# Patient Record
Sex: FEMALE | Race: WHITE | NOT HISPANIC OR LATINO | Employment: STUDENT | RURAL
[De-identification: names, ages, dates, MRNs, and addresses within clinical notes are randomized per-mention and may not be internally consistent; named-entity substitution may affect disease eponyms.]

---
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
## 2025-01-16 DIAGNOSIS — Z30.011 ENCOUNTER FOR INITIAL PRESCRIPTION OF CONTRACEPTIVE PILLS: ICD-10-CM

## 2025-01-16 RX ORDER — LEVONORGESTREL/ETHIN.ESTRADIOL 0.1-0.02MG
1 TABLET ORAL DAILY
Qty: 28 TABLET | Refills: 2 | Status: SHIPPED | OUTPATIENT
Start: 2025-01-16 | End: 2026-01-16

## 2025-01-16 RX ORDER — FLUCONAZOLE 100 MG/1
100 TABLET ORAL DAILY
Qty: 5 TABLET | Refills: 0 | Status: SHIPPED | OUTPATIENT
Start: 2025-01-16 | End: 2025-01-21

## 2025-03-18 ENCOUNTER — OFFICE VISIT (OUTPATIENT)
Dept: OBSTETRICS AND GYNECOLOGY | Facility: CLINIC | Age: 20
End: 2025-03-18
Payer: COMMERCIAL

## 2025-03-18 VITALS
HEIGHT: 66 IN | RESPIRATION RATE: 19 BRPM | BODY MASS INDEX: 25.55 KG/M2 | DIASTOLIC BLOOD PRESSURE: 72 MMHG | OXYGEN SATURATION: 100 % | WEIGHT: 159 LBS | HEART RATE: 89 BPM | SYSTOLIC BLOOD PRESSURE: 110 MMHG

## 2025-03-18 DIAGNOSIS — N92.1 MENORRHAGIA WITH IRREGULAR CYCLE: ICD-10-CM

## 2025-03-18 DIAGNOSIS — N94.6 DYSMENORRHEA: Primary | ICD-10-CM

## 2025-03-18 PROCEDURE — 3008F BODY MASS INDEX DOCD: CPT | Mod: CPTII,,, | Performed by: ADVANCED PRACTICE MIDWIFE

## 2025-03-18 PROCEDURE — 99999 PR PBB SHADOW E&M-EST. PATIENT-LVL IV: CPT | Mod: PBBFAC,,, | Performed by: ADVANCED PRACTICE MIDWIFE

## 2025-03-18 PROCEDURE — 1159F MED LIST DOCD IN RCRD: CPT | Mod: CPTII,,, | Performed by: ADVANCED PRACTICE MIDWIFE

## 2025-03-18 PROCEDURE — 3074F SYST BP LT 130 MM HG: CPT | Mod: CPTII,,, | Performed by: ADVANCED PRACTICE MIDWIFE

## 2025-03-18 PROCEDURE — 3078F DIAST BP <80 MM HG: CPT | Mod: CPTII,,, | Performed by: ADVANCED PRACTICE MIDWIFE

## 2025-03-18 PROCEDURE — 99214 OFFICE O/P EST MOD 30 MIN: CPT | Mod: PBBFAC | Performed by: ADVANCED PRACTICE MIDWIFE

## 2025-03-18 PROCEDURE — 99213 OFFICE O/P EST LOW 20 MIN: CPT | Mod: S$PBB,,, | Performed by: ADVANCED PRACTICE MIDWIFE

## 2025-03-18 RX ORDER — NORGESTIMATE AND ETHINYL ESTRADIOL 7DAYSX3 LO
1 KIT ORAL DAILY
Qty: 28 TABLET | Refills: 11 | Status: SHIPPED | OUTPATIENT
Start: 2025-03-18 | End: 2026-03-18

## 2025-03-18 RX ORDER — IBUPROFEN 800 MG/1
800 TABLET ORAL EVERY 8 HOURS PRN
Qty: 30 TABLET | Refills: 11 | Status: SHIPPED | OUTPATIENT
Start: 2025-03-18 | End: 2026-03-18

## 2025-03-18 NOTE — PROGRESS NOTES
"Subjective     Patient ID: Reginald Castro is a 19 y.o. female.    Chief Complaint: Menometrorrhagia (Pt stated that she has been having irregular bleeding and you up the mg of her OCP but she has since stopped taking it because she was having "bad mood swings and was really irritable." Pt did ask if you could seen in ibuprofen 800 mg for pain when she is on her cycle.)    Stopped taking OC's because of mood swings and irritability.   No cycle since stopping OC's  Cycles are painful, pain starts 1-2 days before cycle.         Review of Systems   Constitutional: Negative.    HENT: Negative.     Eyes: Negative.    Respiratory: Negative.     Cardiovascular: Negative.    Gastrointestinal: Negative.    Endocrine: Negative.    Genitourinary:  Positive for menstrual irregularity and menstrual problem.   Musculoskeletal: Negative.    Integumentary:  Negative.   Allergic/Immunologic: Negative.    Neurological: Negative.    Psychiatric/Behavioral: Negative.       Past Medical History:   Diagnosis Date    Anxiety disorder, unspecified     Depression      Past Surgical History:   Procedure Laterality Date    MYRINGOTOMY WITH INSERTION OF VENTILATION TUBE Bilateral 2021    Procedure: MYRINGOTOMY, WITH TYMPANOSTOMY TUBE INSERTION;  Surgeon: Ethan Willett MD;  Location: [facility] OR;  Service: ENT;  Laterality: Bilateral;    TONSILLECTOMY, ADENOIDECTOMY Bilateral 2021    Procedure: TONSILLECTOMY AND ADENOIDECTOMY;  Surgeon: Ethan Willett MD;  Location: [facility] OR;  Service: ENT;  Laterality: Bilateral;      OB History    Para Term  AB Living   0 0 0 0 0 0   SAB IAB Ectopic Multiple Live Births   0 0 0 0 0      Current Outpatient Medications   Medication Instructions    ibuprofen (ADVIL,MOTRIN) 800 mg, Oral, Every 8 hours PRN    levonorgestrel-ethinyl estradiol 0.1-20 mg-mcg per tablet 1 tablet, Oral, Daily    norgestimate-ethinyl estradioL (ORTHO TRI-CYCLEN LO) 0.18/0.215/0.25 mg-25 mcg tablet 1 " tablet, Oral, Daily    venlafaxine (EFFEXOR-XR) 37.5 mg, Daily         Objective   Vitals:    03/18/25 1511   BP: 110/72   Pulse: 89   Resp: 19     Wt Readings from Last 2 Encounters:   03/18/25 72.1 kg (159 lb)   11/12/24 76.2 kg (168 lb)      Physical Exam  Vitals reviewed.   Constitutional:       Appearance: Normal appearance.   HENT:      Head: Normocephalic.   Cardiovascular:      Rate and Rhythm: Normal rate and regular rhythm.   Pulmonary:      Effort: Pulmonary effort is normal.      Breath sounds: Normal breath sounds.   Abdominal:      General: Abdomen is flat.      Palpations: Abdomen is soft.   Musculoskeletal:         General: Normal range of motion.      Cervical back: Normal range of motion.   Skin:     General: Skin is warm and dry.   Neurological:      Mental Status: She is alert and oriented to person, place, and time.   Psychiatric:         Mood and Affect: Mood normal.         Behavior: Behavior normal.        Assessment and Plan   1. Dysmenorrhea  -     ibuprofen (ADVIL,MOTRIN) 800 MG tablet; Take 1 tablet (800 mg total) by mouth every 8 (eight) hours as needed for Pain.  Dispense: 30 tablet; Refill: 11  -     norgestimate-ethinyl estradioL (ORTHO TRI-CYCLEN LO) 0.18/0.215/0.25 mg-25 mcg tablet; Take 1 tablet by mouth once daily.  Dispense: 28 tablet; Refill: 11    2. Menorrhagia with irregular cycle  -     ibuprofen (ADVIL,MOTRIN) 800 MG tablet; Take 1 tablet (800 mg total) by mouth every 8 (eight) hours as needed for Pain.  Dispense: 30 tablet; Refill: 11  -     norgestimate-ethinyl estradioL (ORTHO TRI-CYCLEN LO) 0.18/0.215/0.25 mg-25 mcg tablet; Take 1 tablet by mouth once daily.  Dispense: 28 tablet; Refill: 11    RX Ibuprofen 800mg and Ortho Tri-cyclen Lo sent to pharmacy  Start new ELLIOTT today, take every day, do not skip pills.  Expect irregular bleeding for 3-4 months.  If problems, call for follow up.      Follow up in 1 year (on 3/18/2026), or if symptoms worsen or fail to improve, for  Birth Control Follow Up.

## 2025-05-19 ENCOUNTER — HOSPITAL ENCOUNTER (EMERGENCY)
Facility: HOSPITAL | Age: 20
Discharge: HOME | End: 2025-05-19
Payer: COMMERCIAL

## 2025-05-19 VITALS
WEIGHT: 151 LBS | BODY MASS INDEX: 24.27 KG/M2 | SYSTOLIC BLOOD PRESSURE: 100 MMHG | DIASTOLIC BLOOD PRESSURE: 67 MMHG | TEMPERATURE: 98 F | HEIGHT: 66 IN | OXYGEN SATURATION: 98 % | HEART RATE: 64 BPM | RESPIRATION RATE: 18 BRPM

## 2025-05-19 DIAGNOSIS — N39.0 URINARY TRACT INFECTION WITH HEMATURIA, SITE UNSPECIFIED: ICD-10-CM

## 2025-05-19 DIAGNOSIS — R11.2 NAUSEA, VOMITING, AND DIARRHEA: ICD-10-CM

## 2025-05-19 DIAGNOSIS — R10.9 ABDOMINAL CRAMPING: ICD-10-CM

## 2025-05-19 DIAGNOSIS — R31.9 URINARY TRACT INFECTION WITH HEMATURIA, SITE UNSPECIFIED: ICD-10-CM

## 2025-05-19 DIAGNOSIS — K52.9 AGE (ACUTE GASTROENTERITIS): Primary | ICD-10-CM

## 2025-05-19 DIAGNOSIS — R19.7 NAUSEA, VOMITING, AND DIARRHEA: ICD-10-CM

## 2025-05-19 LAB
ALBUMIN SERPL BCP-MCNC: 4.5 G/DL (ref 3.5–5)
ALBUMIN/GLOB SERPL: 1.3 {RATIO}
ALP SERPL-CCNC: 36 U/L (ref 40–150)
ALT SERPL W P-5'-P-CCNC: 12 U/L
ANION GAP SERPL CALCULATED.3IONS-SCNC: 18 MMOL/L (ref 7–16)
AST SERPL W P-5'-P-CCNC: 21 U/L (ref 11–45)
BACTERIA #/AREA URNS HPF: ABNORMAL /HPF
BASOPHILS # BLD AUTO: 0.05 K/UL (ref 0–0.2)
BASOPHILS NFR BLD AUTO: 0.3 % (ref 0–1)
BILIRUB SERPL-MCNC: 0.4 MG/DL
BILIRUB UR QL STRIP: ABNORMAL
BUN SERPL-MCNC: 13 MG/DL (ref 7–19)
BUN/CREAT SERPL: 15 (ref 6–20)
CALCIUM SERPL-MCNC: 9.5 MG/DL (ref 8.4–10.2)
CHLORIDE SERPL-SCNC: 105 MMOL/L (ref 98–107)
CLARITY UR: ABNORMAL
CO2 SERPL-SCNC: 20 MMOL/L (ref 22–29)
COLOR UR: YELLOW
CREAT SERPL-MCNC: 0.87 MG/DL (ref 0.55–1.02)
DIFFERENTIAL METHOD BLD: ABNORMAL
EGFR (NO RACE VARIABLE) (RUSH/TITUS): 99 ML/MIN/1.73M2
EOSINOPHIL # BLD AUTO: 0.28 K/UL (ref 0–0.5)
EOSINOPHIL NFR BLD AUTO: 1.8 % (ref 1–4)
ERYTHROCYTE [DISTWIDTH] IN BLOOD BY AUTOMATED COUNT: 12.2 % (ref 11.5–14.5)
GLOBULIN SER-MCNC: 3.6 G/DL (ref 2–4)
GLUCOSE SERPL-MCNC: 121 MG/DL (ref 74–100)
GLUCOSE UR STRIP-MCNC: NEGATIVE MG/DL
HCG UR QL IA.RAPID: NEGATIVE
HCT VFR BLD AUTO: 38.6 % (ref 38–47)
HGB BLD-MCNC: 13.6 G/DL (ref 12–16)
IMM GRANULOCYTES # BLD AUTO: 0.05 K/UL (ref 0–0.04)
IMM GRANULOCYTES NFR BLD: 0.3 % (ref 0–0.4)
KETONES UR STRIP-SCNC: ABNORMAL MG/DL
LEUKOCYTE ESTERASE UR QL STRIP: NEGATIVE
LIPASE SERPL-CCNC: 26 U/L
LYMPHOCYTES # BLD AUTO: 2.78 K/UL (ref 1–4.8)
LYMPHOCYTES NFR BLD AUTO: 18.3 % (ref 27–41)
MAGNESIUM SERPL-MCNC: 1.7 MG/DL (ref 1.7–2.2)
MCH RBC QN AUTO: 30.4 PG (ref 27–31)
MCHC RBC AUTO-ENTMCNC: 35.2 G/DL (ref 32–36)
MCV RBC AUTO: 86.2 FL (ref 80–96)
MONOCYTES # BLD AUTO: 0.95 K/UL (ref 0–0.8)
MONOCYTES NFR BLD AUTO: 6.3 % (ref 2–6)
MPC BLD CALC-MCNC: 12.2 FL (ref 9.4–12.4)
MUCOUS THREADS #/AREA URNS HPF: ABNORMAL /HPF
NEUTROPHILS # BLD AUTO: 11.09 K/UL (ref 1.8–7.7)
NEUTROPHILS NFR BLD AUTO: 73 % (ref 53–65)
NITRITE UR QL STRIP: NEGATIVE
NRBC # BLD AUTO: 0 X10E3/UL
NRBC, AUTO (.00): 0 %
PH UR STRIP: 6.5 PH UNITS
PLATELET # BLD AUTO: 249 K/UL (ref 150–400)
POTASSIUM SERPL-SCNC: 3.5 MMOL/L (ref 3.5–5.1)
PROT SERPL-MCNC: 8.1 G/DL (ref 6.4–8.3)
PROT UR QL STRIP: 30
RBC # BLD AUTO: 4.48 M/UL (ref 4.2–5.4)
RBC # UR STRIP: ABNORMAL /UL
RBC #/AREA URNS HPF: ABNORMAL /HPF
SODIUM SERPL-SCNC: 139 MMOL/L (ref 136–145)
SP GR UR STRIP: 1.02
SQUAMOUS #/AREA URNS LPF: ABNORMAL /LPF
UROBILINOGEN UR STRIP-ACNC: 0.2 MG/DL
WBC # BLD AUTO: 15.2 K/UL (ref 4.5–11)
WBC #/AREA URNS HPF: ABNORMAL /HPF

## 2025-05-19 PROCEDURE — 25500020 PHARM REV CODE 255

## 2025-05-19 PROCEDURE — 85025 COMPLETE CBC W/AUTO DIFF WBC: CPT

## 2025-05-19 PROCEDURE — 81003 URINALYSIS AUTO W/O SCOPE: CPT

## 2025-05-19 PROCEDURE — 25000003 PHARM REV CODE 250

## 2025-05-19 PROCEDURE — 80053 COMPREHEN METABOLIC PANEL: CPT

## 2025-05-19 PROCEDURE — 96365 THER/PROPH/DIAG IV INF INIT: CPT

## 2025-05-19 PROCEDURE — 63600175 PHARM REV CODE 636 W HCPCS

## 2025-05-19 PROCEDURE — 83735 ASSAY OF MAGNESIUM: CPT

## 2025-05-19 PROCEDURE — 87077 CULTURE AEROBIC IDENTIFY: CPT

## 2025-05-19 PROCEDURE — 83690 ASSAY OF LIPASE: CPT

## 2025-05-19 PROCEDURE — 96361 HYDRATE IV INFUSION ADD-ON: CPT

## 2025-05-19 PROCEDURE — 99284 EMERGENCY DEPT VISIT MOD MDM: CPT | Mod: ,,,

## 2025-05-19 PROCEDURE — 96372 THER/PROPH/DIAG INJ SC/IM: CPT

## 2025-05-19 PROCEDURE — 81025 URINE PREGNANCY TEST: CPT

## 2025-05-19 PROCEDURE — 99285 EMERGENCY DEPT VISIT HI MDM: CPT | Mod: 25

## 2025-05-19 PROCEDURE — 96375 TX/PRO/DX INJ NEW DRUG ADDON: CPT

## 2025-05-19 RX ORDER — DICYCLOMINE HYDROCHLORIDE 10 MG/ML
20 INJECTION INTRAMUSCULAR
Status: COMPLETED | OUTPATIENT
Start: 2025-05-19 | End: 2025-05-19

## 2025-05-19 RX ORDER — CEFTRIAXONE 1 G/1
1 INJECTION, POWDER, FOR SOLUTION INTRAMUSCULAR; INTRAVENOUS
Status: COMPLETED | OUTPATIENT
Start: 2025-05-19 | End: 2025-05-19

## 2025-05-19 RX ORDER — FLUOXETINE 10 MG/1
10 CAPSULE ORAL DAILY
COMMUNITY

## 2025-05-19 RX ORDER — ONDANSETRON 4 MG/1
4 TABLET, ORALLY DISINTEGRATING ORAL EVERY 8 HOURS PRN
Qty: 10 TABLET | Refills: 0 | Status: SHIPPED | OUTPATIENT
Start: 2025-05-19

## 2025-05-19 RX ORDER — ACETAMINOPHEN 325 MG/1
650 TABLET ORAL
Status: COMPLETED | OUTPATIENT
Start: 2025-05-19 | End: 2025-05-19

## 2025-05-19 RX ORDER — IOPAMIDOL 755 MG/ML
100 INJECTION, SOLUTION INTRAVASCULAR
Status: COMPLETED | OUTPATIENT
Start: 2025-05-19 | End: 2025-05-19

## 2025-05-19 RX ORDER — NITROFURANTOIN 25; 75 MG/1; MG/1
100 CAPSULE ORAL 2 TIMES DAILY
Qty: 10 CAPSULE | Refills: 0 | Status: SHIPPED | OUTPATIENT
Start: 2025-05-19 | End: 2025-05-24

## 2025-05-19 RX ORDER — ONDANSETRON HYDROCHLORIDE 2 MG/ML
4 INJECTION, SOLUTION INTRAVENOUS
Status: COMPLETED | OUTPATIENT
Start: 2025-05-19 | End: 2025-05-19

## 2025-05-19 RX ADMIN — DICYCLOMINE HYDROCHLORIDE 20 MG: 10 INJECTION, SOLUTION INTRAMUSCULAR at 04:05

## 2025-05-19 RX ADMIN — PROMETHAZINE HYDROCHLORIDE 12.5 MG: 25 INJECTION INTRAMUSCULAR; INTRAVENOUS at 05:05

## 2025-05-19 RX ADMIN — CEFTRIAXONE SODIUM 1 G: 1 INJECTION, POWDER, FOR SOLUTION INTRAMUSCULAR; INTRAVENOUS at 04:05

## 2025-05-19 RX ADMIN — IOPAMIDOL 100 ML: 755 INJECTION, SOLUTION INTRAVENOUS at 05:05

## 2025-05-19 RX ADMIN — ONDANSETRON 4 MG: 2 INJECTION INTRAMUSCULAR; INTRAVENOUS at 03:05

## 2025-05-19 RX ADMIN — SODIUM CHLORIDE 1000 ML: 0.9 INJECTION, SOLUTION INTRAVENOUS at 03:05

## 2025-05-19 RX ADMIN — ACETAMINOPHEN 650 MG: 325 TABLET ORAL at 04:05

## 2025-05-19 NOTE — Clinical Note
"                        Reginald SMITH"Gloria was seen and treated in our emergency department on 5/19/2025.  She may return to work on 05/21/2025.       If you have any questions or concerns, please don't hesitate to call.      Saeed Allison, FNP"

## 2025-05-19 NOTE — DISCHARGE INSTRUCTIONS
Suspect viral gastroenteritis.  Take Zofran as prescribed for nausea/vomiting.  Tylenol over-the-counter for pain and fever control.  Increase fluids especially electrolyte balanced fluids such as Gatorade, Powerade, or Pedialyte to maintain proper hydration.  Antibiotics as directed.  Urine culture is pending and we will call you if there are any changes that need to be made to the treatment plan once these results.  Follow up with the primary care provider of your choice in 1-2 days for a recheck.  Return to the emergency department for uncontrolled pain, uncontrolled vomiting, or any other new or worrisome symptoms.

## 2025-05-19 NOTE — ED PROVIDER NOTES
Encounter Date: 5/19/2025       History     Chief Complaint   Patient presents with    Vomiting     V/D x 2 hrs     [age]-year-old female presents to the ED for N/V/D x2 hours.  Symptoms began suddenly and awoke her from her sleep.  Does admit to abdominal cramping.  Denies hematochezia, hematemesis, fever, or any other complaints at this time.  No treatment prior to arrival to the ED. Vital signs stable.  She appears in no immediate distress.    The history is provided by the patient.     Review of patient's allergies indicates:  No Known Allergies  Past Medical History:   Diagnosis Date    Anxiety disorder, unspecified     Depression      Past Surgical History:   Procedure Laterality Date    MYRINGOTOMY WITH INSERTION OF VENTILATION TUBE Bilateral 12/28/2021    Procedure: MYRINGOTOMY, WITH TYMPANOSTOMY TUBE INSERTION;  Surgeon: Ethan Willett MD;  Location: [facility];  Service: ENT;  Laterality: Bilateral;    TONSILLECTOMY, ADENOIDECTOMY Bilateral 12/28/2021    Procedure: TONSILLECTOMY AND ADENOIDECTOMY;  Surgeon: Ethan Willett MD;  Location: [facility];  Service: ENT;  Laterality: Bilateral;     Family History   Problem Relation Name Age of Onset    Ovarian cancer Maternal Grandmother      Cancer Maternal Grandmother          ovarian    Hypertension Father      Diabetes Father      Hypertension Mother      Diabetes Mother       Social History[1]  Review of Systems   Constitutional:  Negative for activity change, chills and fever.   HENT:  Negative for congestion and sore throat.    Eyes: Negative.    Respiratory:  Negative for cough and shortness of breath.    Cardiovascular:  Negative for chest pain and palpitations.   Gastrointestinal:  Positive for abdominal pain (Generalized cramping), diarrhea, nausea and vomiting.   Endocrine: Negative.    Genitourinary:  Negative for dysuria and frequency.   Musculoskeletal:  Negative for back pain, neck pain and neck stiffness.   Skin:  Negative for color  change, pallor and rash.   Allergic/Immunologic: Negative.    Neurological:  Negative for dizziness, syncope, speech difficulty, weakness and headaches.   Hematological:  Does not bruise/bleed easily.   Psychiatric/Behavioral:  Negative for confusion. The patient is not nervous/anxious.    All other systems reviewed and are negative.      Physical Exam     Initial Vitals [05/19/25 0300]   BP Pulse Resp Temp SpO2   126/86 98 18 97.5 °F (36.4 °C) 99 %      MAP       --         Physical Exam    Nursing note and vitals reviewed.  Constitutional: Vital signs are normal. She appears well-developed and well-nourished. She is not diaphoretic. She is cooperative. She appears ill. No distress.   HENT:   Head: Normocephalic and atraumatic. Mouth/Throat: Oropharynx is clear and moist.   Eyes: Conjunctivae and EOM are normal. Pupils are equal, round, and reactive to light.   Neck: Neck supple.   Normal range of motion.  Cardiovascular:  Normal rate, regular rhythm and normal heart sounds.           Pulmonary/Chest: Breath sounds normal. No respiratory distress. She has no wheezes. She has no rhonchi. She has no rales. She exhibits no tenderness.   Abdominal: Abdomen is soft. She exhibits no distension. Bowel sounds are increased. There is generalized abdominal tenderness.   No right CVA tenderness.  No left CVA tenderness. There is no rebound and no guarding.   Musculoskeletal:         General: Normal range of motion.      Cervical back: Normal range of motion and neck supple.     Neurological: She is alert and oriented to person, place, and time. She has normal strength. GCS score is 15. GCS eye subscore is 4. GCS verbal subscore is 5. GCS motor subscore is 6.   Skin: Skin is warm and dry. Capillary refill takes less than 2 seconds.   Psychiatric: She has a normal mood and affect. Her behavior is normal. Judgment and thought content normal.         Medical Screening Exam   See Full Note    ED Course   Procedures  Labs Reviewed    URINALYSIS, REFLEX TO URINE CULTURE - Abnormal       Result Value    Color, UA Yellow      Clarity, UA Cloudy      pH, UA 6.5      Leukocytes, UA Negative      Nitrites, UA Negative      Protein, UA 30 (*)     Glucose, UA Negative      Ketones, UA Trace      Urobilinogen, UA 0.2      Bilirubin, UA Small (*)     Blood, UA Trace-Intact (*)     Specific Gravity, UA 1.025     COMPREHENSIVE METABOLIC PANEL - Abnormal    Sodium 139      Potassium 3.5      Chloride 105      CO2 20 (*)     Anion Gap 18 (*)     Glucose 121 (*)     BUN 13      Creatinine 0.87      BUN/Creatinine Ratio 15      Calcium 9.5      Total Protein 8.1      Albumin 4.5      Globulin 3.6      A/G Ratio 1.3      Bilirubin, Total 0.4      Alk Phos 36 (*)     ALT 12      AST 21      eGFR 99     CBC WITH DIFFERENTIAL - Abnormal    WBC 15.20 (*)     RBC 4.48      Hemoglobin 13.6      Hematocrit 38.6      MCV 86.2      MCH 30.4      MCHC 35.2      RDW 12.2      Platelet Count 249      MPV 12.2      Neutrophils % 73.0 (*)     Lymphocytes % 18.3 (*)     Monocytes % 6.3 (*)     Eosinophils % 1.8      Basophils % 0.3      Immature Granulocytes % 0.3      nRBC, Auto 0.0      Neutrophils, Abs 11.09 (*)     Lymphocytes, Absolute 2.78      Monocytes, Absolute 0.95 (*)     Eosinophils, Absolute 0.28      Basophils, Absolute 0.05      Immature Granulocytes, Absolute 0.05 (*)     nRBC, Absolute 0.00      Diff Type Auto     URINALYSIS, MICROSCOPIC - Abnormal    WBC, UA 5-10 (*)     RBC, UA 0-3      Bacteria, UA Moderate (*)     Squamous Epithelial Cells, UA Moderate (*)     Mucus, UA Many (*)    HCG QUALITATIVE URINE - Normal    HCG Qualitative, Urine Negative     LIPASE - Normal    Lipase 26     MAGNESIUM - Normal    Magnesium 1.7     CULTURE, URINE   CBC W/ AUTO DIFFERENTIAL    Narrative:     The following orders were created for panel order CBC auto differential.  Procedure                               Abnormality         Status                     ---------                                -----------         ------                     CBC with Differential[[id number]]       Abnormal            Final result                 Please view results for these tests on the individual orders.          Imaging Results              CT Abdomen Pelvis With IV Contrast NO Oral Contrast (In process)                      Medications   sodium chloride 0.9% bolus 1,000 mL 1,000 mL (0 mLs Intravenous Stopped 5/19/25 0421)   ondansetron injection 4 mg (4 mg Intravenous Given 5/19/25 6747)   acetaminophen tablet 650 mg (650 mg Oral Given 5/19/25 0418)   dicyclomine injection 20 mg (20 mg Intramuscular Given 5/19/25 0420)   cefTRIAXone injection 1 g (1 g Intravenous Given 5/19/25 0426)   promethazine (PHENERGAN) 12.5 mg in 0.9% NaCl 50 mL IVPB (0 mg Intravenous Stopped 5/19/25 0532)   iopamidoL (ISOVUE-370) injection 100 mL (100 mLs Intravenous Given 5/19/25 3669)     Medical Decision Making  Presents for sudden onset of N/V/D.  A&O x4.  GCS 15.  Vital signs stable.  Currently afebrile.  Generalized abdominal cramping.  No focal tenderness on exam.  Actively vomiting at the time of the exam.  No hematemesis witnessed.  Suspect gastroenteritis.  We will initiate IV access, obtain baseline labs, send urine sample, provide Zofran 4 mg IV for control of nausea/vomiting, and normal saline 1 L IV bolus for rehydration.    04:35 re-evaluated once again.  Urine preg negative.  Providing p.o. Tylenol and IM Bentyl for control of abdominal cramping.  P.o. challenge initially tolerated well but had return of nausea during observation period.  No further vomiting thus far.  Results discussed in detail with the patient and her family.  Mild elevated WBC.  They also admit to multiple recent exposures to family and friends with the stomach virus.  She did have 5-10 WBC, moderate bacteria, many mucus in her urine sample and we will cover with Rocephin here in the ED and plan for a short course of Macrobid until the  urine culture can be resulted.  Still suspect gastroenteritis at current but given her elevated wbc, continued abdominal discomfort, and return of nausea we did recommend CT of the abdomen and pelvis to rule out appendicitis or other emergent intra-abdominal etiology.  Patient and family are in agreement.    07:05 CT shows mild enteritis.  Normal appendix.  Results discussed in detail with the patient and her family.  Discharge instructions and follow up discussed in detail.  Strict ED return precautions explained in detail.  We will provide written instructions as well.  All questions answered.  They verbalized understanding and agreement with this plan.    Amount and/or Complexity of Data Reviewed  Independent Historian:      Details: [age]-year-old female presents to the ED for N/V/D x2 hours.  Symptoms began suddenly and awoke her from her sleep.  Does admit to abdominal cramping.  Denies hematochezia, hematemesis, fever, or any other complaints at this time.  No treatment prior to arrival to the ED. Vital signs stable.  She appears in no immediate distress.  Labs: ordered.     Details: CBC shows a WBC of 15.2, hemoglobin of 13.6, hematocrit 38.6, and a platelet count 249.  Lipase normal at 26.  CMP shows CO2 of 20, anion gap of 18, glucose of 121, alk-phos of 36, and otherwise unremarkable.  Magnesium 1.7.  Urine pregnancy test negative.  Urinalysis shows 30 of protein, small bilirubin, and trace blood but otherwise unremarkable.  Microscopic urinalysis shows 5-10 WBC, moderate bacteria, many mucus.  Urine culture sent and pending.  Radiology: ordered.     Details: CT abdomen and pelvis with contrast shows possible mild enteritis.  Some mild segmental wall thickening in the small bowel.  No bowel obstruction.  Normal appendix.    Risk  OTC drugs.  Prescription drug management.  Risk Details: Reginald Castro likely has viral acute gastro-enteritis. Able to take down POs.  UTI so we will provide Macrobid  until urine culture results.    Given the large differential diagnosis for Reginald Castro, the decision making in this case is of high complexity.    After evaluating all of the data points in this case, the presentation of Reginald Castro is NOT consistent with AAA; Mesenteric Ischemia; Bowel Perforation; Bowel Obstruction; Sigmoid Volvulus; Diverticulitis; Appendicitis; Peritonitis; Cholecystitis, ascending cholangitis or other gallbladder disease; perforated ulcer; significant GI bleeding, splenic rupture/infarction; Hepatic abscess; or other surgical/acute abdomen.    Similarly, this presentation is NOT consistent with ACS or Myocardial Ischemia or cardiac etiology; Pulmonary Embolism; fistula; incarcerated hernia; Pancreatitis, Aortic Dissection; Diabetic Ketoacidosis; Kidney Stone; Ischemic colitis; Psoas or other abscess; Methanol poisoning; Heavy metal toxicity; or porphyria.    Similarly, this case is NOT consistent with Tkmb-Yyio-Gjnocv Syndrome, Ectopic Pregnancy, Placental Abruption, PID, Tubo-ovarian abscess, Ovarian Torsion, or STI.    Similarly, this presentation is NOT consistent with acute coronary syndrome, pulmonary embolism, dissection, borhaave's, arrythmia, pneumothorax, cardiac tamponade, or other emergent cardiopulmonary condition.    Similarly, this presentation is NOT consistent with sepsis, pyelonephritis, urinary infection, pneumonia, or other focal bacterial infection.    Strict return and follow-up precautions have been given by me personally to the patient/family/caregiver(s).    Data Reviewed/Counseling: I have reviewed the patient's vital signs, nursing notes, and other relevant tests/information. I had a detailed discussion regarding the historical points, exam findings, and any diagnostic results supporting the discharge diagnosis. I also discussed the need for outpatient follow-up and the need to return to the ED if symptoms worsen or if there are any questions or concerns that  arise at home.   Final diagnoses:  [R11.2, R19.7] Nausea, vomiting, and diarrhea  [R10.9] Abdominal cramping  [K52.9] AGE (acute gastroenteritis) (Primary)  [N39.0, R31.9] Urinary tract infection with hematuria, site unspecified               ED Course as of 05/19/25 0708   Mon May 19, 2025   0411 No further vomiting after Zofran and she reports improvement in nausea. Requesting something to drink so we will PO challenge with Pedialyte given her borderline electrolytes. Continues to have diffuse abd cramping with no focal tenderness on repeat palpation. Urine still pending. []   0542 Patient reports that she is feeling better after beginning the phenergan and family is requesting to go home. CT still pending and recommended obtaining results before disposition. They are agreeable at current. []      ED Course User Index  [] Saeed Allison FNP                           Clinical Impression:   Final diagnoses:  [R11.2, R19.7] Nausea, vomiting, and diarrhea  [R10.9] Abdominal cramping  [K52.9] AGE (acute gastroenteritis) (Primary)  [N39.0, R31.9] Urinary tract infection with hematuria, site unspecified        ED Disposition Condition    Discharge Stable          ED Prescriptions       Medication Sig Dispense Start Date End Date Auth. Provider    ondansetron (ZOFRAN-ODT) 4 MG TbDL Take 1 tablet (4 mg total) by mouth every 8 (eight) hours as needed (Nausea/vomiting). 10 tablet 5/19/2025 -- Saeed Allison FNP    nitrofurantoin, macrocrystal-monohydrate, (MACROBID) 100 MG capsule Take 1 capsule (100 mg total) by mouth 2 (two) times daily. for 5 days 10 capsule 5/19/2025 5/24/2025 Saeed Allison FNP          Follow-up Information       Follow up With Specialties Details Why Contact Info    Anderson Guo FNP-C Family Medicine Schedule an appointment as soon as possible for a visit in 2 days  [address] [place] [address] [place] [address] [phone]                   [1]   Social History  Tobacco Use    Smoking  status: Some Days     Types: Vaping with nicotine     Start date: 3/6/2024    Smokeless tobacco: Never   Substance Use Topics    Alcohol use: Never    Drug use: Never        Saeed Allison, [facility]  05/19/25 0708

## 2025-05-20 ENCOUNTER — RESULTS FOLLOW-UP (OUTPATIENT)
Dept: EMERGENCY MEDICINE | Facility: HOSPITAL | Age: 20
End: 2025-05-20

## 2025-05-21 LAB
UA COMPLETE W REFLEX CULTURE PNL UR: ABNORMAL
UA COMPLETE W REFLEX CULTURE PNL UR: ABNORMAL

## 2025-05-22 NOTE — TELEPHONE ENCOUNTER
Called and left a voicemail for her to call back. Carl wants to see if she wants Diflucan for yeast in urine culture.

## 2025-05-26 ENCOUNTER — HOSPITAL ENCOUNTER (EMERGENCY)
Facility: HOSPITAL | Age: 20
Discharge: HOME | End: 2025-05-27
Payer: COMMERCIAL

## 2025-05-26 DIAGNOSIS — R19.7 DIARRHEA, UNSPECIFIED TYPE: ICD-10-CM

## 2025-05-26 DIAGNOSIS — R11.0 NAUSEA: ICD-10-CM

## 2025-05-26 DIAGNOSIS — R10.84 GENERALIZED ABDOMINAL PAIN: Primary | ICD-10-CM

## 2025-05-26 PROCEDURE — 99284 EMERGENCY DEPT VISIT MOD MDM: CPT | Mod: ,,, | Performed by: NURSE PRACTITIONER

## 2025-05-26 PROCEDURE — 99285 EMERGENCY DEPT VISIT HI MDM: CPT | Mod: 25

## 2025-05-26 RX ORDER — ONDANSETRON HYDROCHLORIDE 2 MG/ML
4 INJECTION, SOLUTION INTRAVENOUS
Status: COMPLETED | OUTPATIENT
Start: 2025-05-27 | End: 2025-05-27

## 2025-05-26 RX ORDER — MORPHINE SULFATE 4 MG/ML
2 INJECTION, SOLUTION INTRAMUSCULAR; INTRAVENOUS
Refills: 0 | Status: COMPLETED | OUTPATIENT
Start: 2025-05-27 | End: 2025-05-27

## 2025-05-26 NOTE — Clinical Note
"Reginald SMITH" Gloria was seen and treated in our emergency department on 5/26/2025.  She may return to school on 05/28/2025.      If you have any questions or concerns, please don't hesitate to call.      Nikkie Flynn, SKYLER"

## 2025-05-27 VITALS
HEIGHT: 66 IN | TEMPERATURE: 98 F | BODY MASS INDEX: 24.56 KG/M2 | OXYGEN SATURATION: 100 % | RESPIRATION RATE: 18 BRPM | HEART RATE: 78 BPM | SYSTOLIC BLOOD PRESSURE: 102 MMHG | DIASTOLIC BLOOD PRESSURE: 70 MMHG | WEIGHT: 152.81 LBS

## 2025-05-27 LAB
ALBUMIN SERPL BCP-MCNC: 4.8 G/DL (ref 3.5–5)
ALBUMIN/GLOB SERPL: 1.3 {RATIO}
ALP SERPL-CCNC: 39 U/L (ref 40–150)
ALT SERPL W P-5'-P-CCNC: 15 U/L
ANION GAP SERPL CALCULATED.3IONS-SCNC: 17 MMOL/L (ref 7–16)
AST SERPL W P-5'-P-CCNC: 20 U/L (ref 11–45)
BASOPHILS # BLD AUTO: 0.05 K/UL (ref 0–0.2)
BASOPHILS NFR BLD AUTO: 0.4 % (ref 0–1)
BILIRUB SERPL-MCNC: 0.4 MG/DL
BILIRUB UR QL STRIP: NEGATIVE
BUN SERPL-MCNC: 12 MG/DL (ref 7–19)
BUN/CREAT SERPL: 12 (ref 6–20)
CALCIUM SERPL-MCNC: 9.7 MG/DL (ref 8.4–10.2)
CHLORIDE SERPL-SCNC: 103 MMOL/L (ref 98–107)
CLARITY UR: CLEAR
CO2 SERPL-SCNC: 22 MMOL/L (ref 22–29)
COLOR UR: YELLOW
CREAT SERPL-MCNC: 1.01 MG/DL (ref 0.55–1.02)
DIFFERENTIAL METHOD BLD: ABNORMAL
EGFR (NO RACE VARIABLE) (RUSH/TITUS): 82 ML/MIN/1.73M2
EOSINOPHIL # BLD AUTO: 0.11 K/UL (ref 0–0.5)
EOSINOPHIL NFR BLD AUTO: 0.9 % (ref 1–4)
ERYTHROCYTE [DISTWIDTH] IN BLOOD BY AUTOMATED COUNT: 12.2 % (ref 11.5–14.5)
GLOBULIN SER-MCNC: 3.8 G/DL (ref 2–4)
GLUCOSE SERPL-MCNC: 110 MG/DL (ref 74–100)
GLUCOSE UR STRIP-MCNC: NEGATIVE MG/DL
HCG UR QL IA.RAPID: NEGATIVE
HCT VFR BLD AUTO: 41.2 % (ref 38–47)
HGB BLD-MCNC: 14.1 G/DL (ref 12–16)
IMM GRANULOCYTES # BLD AUTO: 0.03 K/UL (ref 0–0.04)
IMM GRANULOCYTES NFR BLD: 0.2 % (ref 0–0.4)
KETONES UR STRIP-SCNC: NEGATIVE MG/DL
LACTATE SERPL-SCNC: 2.1 MMOL/L (ref 0.5–2.2)
LEUKOCYTE ESTERASE UR QL STRIP: NEGATIVE
LIPASE SERPL-CCNC: 23 U/L
LYMPHOCYTES # BLD AUTO: 2.82 K/UL (ref 1–4.8)
LYMPHOCYTES NFR BLD AUTO: 22.8 % (ref 27–41)
MAGNESIUM SERPL-MCNC: 1.7 MG/DL (ref 1.7–2.2)
MCH RBC QN AUTO: 30.3 PG (ref 27–31)
MCHC RBC AUTO-ENTMCNC: 34.2 G/DL (ref 32–36)
MCV RBC AUTO: 88.6 FL (ref 80–96)
MONOCYTES # BLD AUTO: 0.48 K/UL (ref 0–0.8)
MONOCYTES NFR BLD AUTO: 3.9 % (ref 2–6)
MPC BLD CALC-MCNC: 12.1 FL (ref 9.4–12.4)
NEUTROPHILS # BLD AUTO: 8.88 K/UL (ref 1.8–7.7)
NEUTROPHILS NFR BLD AUTO: 71.8 % (ref 53–65)
NITRITE UR QL STRIP: NEGATIVE
NRBC # BLD AUTO: 0 X10E3/UL
NRBC, AUTO (.00): 0 %
PH UR STRIP: 6.5 PH UNITS
PLATELET # BLD AUTO: 241 K/UL (ref 150–400)
POTASSIUM SERPL-SCNC: 3.3 MMOL/L (ref 3.5–5.1)
PROT SERPL-MCNC: 8.6 G/DL (ref 6.4–8.3)
PROT UR QL STRIP: NEGATIVE
RBC # BLD AUTO: 4.65 M/UL (ref 4.2–5.4)
RBC # UR STRIP: NEGATIVE /UL
SODIUM SERPL-SCNC: 139 MMOL/L (ref 136–145)
SP GR UR STRIP: 1.01
UROBILINOGEN UR STRIP-ACNC: 0.2 MG/DL
WBC # BLD AUTO: 12.37 K/UL (ref 4.5–11)

## 2025-05-27 PROCEDURE — 83690 ASSAY OF LIPASE: CPT | Performed by: NURSE PRACTITIONER

## 2025-05-27 PROCEDURE — 81025 URINE PREGNANCY TEST: CPT | Performed by: NURSE PRACTITIONER

## 2025-05-27 PROCEDURE — 80053 COMPREHEN METABOLIC PANEL: CPT | Performed by: NURSE PRACTITIONER

## 2025-05-27 PROCEDURE — 83735 ASSAY OF MAGNESIUM: CPT | Performed by: NURSE PRACTITIONER

## 2025-05-27 PROCEDURE — 96361 HYDRATE IV INFUSION ADD-ON: CPT

## 2025-05-27 PROCEDURE — 81003 URINALYSIS AUTO W/O SCOPE: CPT | Performed by: NURSE PRACTITIONER

## 2025-05-27 PROCEDURE — 63600175 PHARM REV CODE 636 W HCPCS: Performed by: NURSE PRACTITIONER

## 2025-05-27 PROCEDURE — 96374 THER/PROPH/DIAG INJ IV PUSH: CPT

## 2025-05-27 PROCEDURE — 85025 COMPLETE CBC W/AUTO DIFF WBC: CPT | Performed by: NURSE PRACTITIONER

## 2025-05-27 PROCEDURE — 25000003 PHARM REV CODE 250: Performed by: NURSE PRACTITIONER

## 2025-05-27 PROCEDURE — 83605 ASSAY OF LACTIC ACID: CPT | Performed by: NURSE PRACTITIONER

## 2025-05-27 PROCEDURE — 25500020 PHARM REV CODE 255: Performed by: NURSE PRACTITIONER

## 2025-05-27 PROCEDURE — 96375 TX/PRO/DX INJ NEW DRUG ADDON: CPT

## 2025-05-27 RX ORDER — IOPAMIDOL 755 MG/ML
100 INJECTION, SOLUTION INTRAVASCULAR
Status: COMPLETED | OUTPATIENT
Start: 2025-05-27 | End: 2025-05-27

## 2025-05-27 RX ADMIN — ONDANSETRON 4 MG: 2 INJECTION INTRAMUSCULAR; INTRAVENOUS at 12:05

## 2025-05-27 RX ADMIN — SODIUM CHLORIDE 1000 ML: 9 INJECTION, SOLUTION INTRAVENOUS at 12:05

## 2025-05-27 RX ADMIN — MORPHINE SULFATE 2 MG: 4 INJECTION INTRAVENOUS at 12:05

## 2025-05-27 RX ADMIN — IOPAMIDOL 100 ML: 755 INJECTION, SOLUTION INTRAVENOUS at 01:05

## 2025-05-27 NOTE — ED PROVIDER NOTES
"Encounter Date: 5/26/2025       History     Chief Complaint   Patient presents with    Abdominal Pain    Nausea    Diarrhea     Presented with c/o nausea and diarrhea and abd pain that started after eating supper around 1700 today. Reports that she ate ribs, mac & cheese. States no vomiting but has had approx 5 watery stools. Notes the last stool was about 2 hours ago. Denies blood or mucus in stool. Reports that she had similar symptoms last week and was diagnosed with AGE. She reports that "everyone" tells her she has AGE but she thinks it is more than that. She reports that she was evaluated in the ED last week. Was given ondansetron that she took earlier today but did not help. She says that she took a Phenergan that she had gotten from someone else. She reports that she is still nauseated. Denies fever or chills. LMP 5/18/25. Denies known or suspect pregnancy.      Review of patient's allergies indicates:  No Known Allergies  Past Medical History:   Diagnosis Date    Anxiety disorder, unspecified     Depression      Past Surgical History:   Procedure Laterality Date    MYRINGOTOMY WITH INSERTION OF VENTILATION TUBE Bilateral 12/28/2021    Procedure: MYRINGOTOMY, WITH TYMPANOSTOMY TUBE INSERTION;  Surgeon: Ethan Willett MD;  Location: [facility] OR;  Service: ENT;  Laterality: Bilateral;    TONSILLECTOMY, ADENOIDECTOMY Bilateral 12/28/2021    Procedure: TONSILLECTOMY AND ADENOIDECTOMY;  Surgeon: Ethan Willett MD;  Location: [facility] OR;  Service: ENT;  Laterality: Bilateral;     Family History   Problem Relation Name Age of Onset    Ovarian cancer Maternal Grandmother      Cancer Maternal Grandmother          ovarian    Hypertension Father      Diabetes Father      Hypertension Mother      Diabetes Mother       Social History[1]  Review of Systems   Constitutional:  Positive for activity change and appetite change. Negative for fever.   HENT: Negative.     Respiratory:  Negative for cough and shortness of " breath.    Cardiovascular:  Negative for chest pain.   Gastrointestinal:  Positive for abdominal pain, diarrhea and nausea. Negative for blood in stool and vomiting.   Genitourinary: Negative.  Negative for decreased urine volume.   Musculoskeletal: Negative.    Skin: Negative.    Neurological: Negative.    Psychiatric/Behavioral: Negative.         Physical Exam     Initial Vitals [05/26/25 2332]   BP Pulse Resp Temp SpO2   (!) 140/104 107 18 98.4 °F (36.9 °C) 100 %      MAP       --         Physical Exam    Nursing note and vitals reviewed.  Constitutional: She appears well-developed and well-nourished. No distress.   HENT:   Head: Normocephalic.   Right Ear: External ear normal.   Left Ear: External ear normal.   Nose: Nose normal. Mouth/Throat: Oropharynx is clear and moist.   Eyes: EOM are normal.   Neck: Neck supple.   Normal range of motion.  Cardiovascular:  Normal rate, regular rhythm, normal heart sounds and intact distal pulses.           No murmur heard.  Pulmonary/Chest: Breath sounds normal. She has no wheezes. She has no rhonchi. She has no rales.   Abdominal: Abdomen is soft. Bowel sounds are normal. She exhibits no distension. There is abdominal tenderness (generalized). There is guarding. There is no rebound.   Musculoskeletal:         General: Normal range of motion.      Cervical back: Normal range of motion and neck supple.     Neurological: She is alert and oriented to person, place, and time. She has normal strength. GCS score is 15. GCS eye subscore is 4. GCS verbal subscore is 5. GCS motor subscore is 6.   Skin: Skin is warm and dry. Capillary refill takes less than 2 seconds.   Psychiatric: She has a normal mood and affect.         Medical Screening Exam   See Full Note    ED Course   Procedures  Labs Reviewed   COMPREHENSIVE METABOLIC PANEL - Abnormal       Result Value    Sodium 139      Potassium 3.3 (*)     Chloride 103      CO2 22      Anion Gap 17 (*)     Glucose 110 (*)     BUN 12       Creatinine 1.01      BUN/Creatinine Ratio 12      Calcium 9.7      Total Protein 8.6 (*)     Albumin 4.8      Globulin 3.8      A/G Ratio 1.3      Bilirubin, Total 0.4      Alk Phos 39 (*)     ALT 15      AST 20      eGFR 82     CBC WITH DIFFERENTIAL - Abnormal    WBC 12.37 (*)     RBC 4.65      Hemoglobin 14.1      Hematocrit 41.2      MCV 88.6      MCH 30.3      MCHC 34.2      RDW 12.2      Platelet Count 241      MPV 12.1      Neutrophils % 71.8 (*)     Lymphocytes % 22.8 (*)     Monocytes % 3.9      Eosinophils % 0.9 (*)     Basophils % 0.4      Immature Granulocytes % 0.2      nRBC, Auto 0.0      Neutrophils, Abs 8.88 (*)     Lymphocytes, Absolute 2.82      Monocytes, Absolute 0.48      Eosinophils, Absolute 0.11      Basophils, Absolute 0.05      Immature Granulocytes, Absolute 0.03      nRBC, Absolute 0.00      Diff Type Auto     HCG QUALITATIVE URINE - Normal    HCG Qualitative, Urine Negative     LACTIC ACID, PLASMA - Normal    Lactic Acid 2.1     LIPASE - Normal    Lipase 23     MAGNESIUM - Normal    Magnesium 1.7     URINALYSIS, REFLEX TO URINE CULTURE    Color, UA Yellow      Clarity, UA Clear      pH, UA 6.5      Leukocytes, UA Negative      Nitrites, UA Negative      Protein, UA Negative      Glucose, UA Negative      Ketones, UA Negative      Urobilinogen, UA 0.2      Bilirubin, UA Negative      Blood, UA Negative      Specific Gravity, UA 1.010     CBC W/ AUTO DIFFERENTIAL    Narrative:     The following orders were created for panel order CBC auto differential.  Procedure                               Abnormality         Status                     ---------                               -----------         ------                     CBC with Differential[[id number]]       Abnormal            Final result                 Please view results for these tests on the individual orders.   LACTIC ACID, PLASMA          Imaging Results              CT Abdomen Pelvis With IV Contrast NO Oral Contrast (In  "process)                   X-Rays:   Independently Interpreted Readings:   Abdomen: CT abd pelvis shows mild free fluid dependent in pelvis with no clear etiology, No bowel obstruction, bowel mucosal abnormality, mild stool burden, no diverticulitis, no free air, normal appendix, unremarkable uterus and adnexa and bladder, liver, gall bladder, pancreas, spleen, adrenal glands and kidneys demonstrate no significant acute abnormality. Incidental punctate nonobstructive subcentimeter nephrolithiasis in superior pole of left kidney.     Medications   sodium chloride 0.9% bolus 1,000 mL 1,000 mL (0 mLs Intravenous Stopped 5/27/25 0147)   ondansetron injection 4 mg (4 mg Intravenous Given 5/27/25 0043)   morphine injection 2 mg (2 mg Intravenous Given 5/27/25 0044)   iopamidoL (ISOVUE-370) injection 100 mL (100 mLs Intravenous Given 5/27/25 0114)     Medical Decision Making  Presented with c/o nausea and diarrhea and abd pain that started after eating supper around 1700 today. Reports that she ate ribs, mac & cheese. States no vomiting but has had approx 5 watery stools. Notes the last stool was about 2 hours ago. Denies blood or mucus in stool. Reports that she had similar symptoms last week and was diagnosed with AGE. She reports that "everyone" tells her she has AGE but she thinks it is more than that. She reports that she was evaluated in the ED last week. Was given ondansetron that she took earlier today but did not help. She says that she took a Phenergan that she had gotten from someone else. She reports that she is still nauseated. Denies fever or chills. LMP 5/18/25. Denies known or suspect pregnancy.    Amount and/or Complexity of Data Reviewed  Labs: ordered. Decision-making details documented in ED Course.     Details: Na 139, K+ 3.3, BUN 12, creatinine 1.01, ALT 15, AST 20, WBC 82003 with H&H 14.1 and 41.2, plt [id number], lactic acid 2.1, Mg 1.7, lipase 23, UA shows no evidence of UTI or dehydration. UPT " neg.  Radiology: ordered.     Details: CT abd pelvis shows mild free fluid dependent in pelvis with no clear etiology, No bowel obstruction, bowel mucosal abnormality, mild stool burden, no diverticulitis, no free air, normal appendix, unremarkable uterus and adnexa and bladder, liver, gall bladder, pancreas, spleen, adrenal glands and kidneys demonstrate no significant acute abnormality. Incidental punctate nonobstructive subcentimeter nephrolithiasis in superior pole of left kidney.    Risk  Prescription drug management.  Risk Details: NS bolus x 1 liter, ondansetron 4 mg IVP, morphine 2 mg IVP given. Reports pain and nausea improved. Discussed assessment and diagnostic findings with pt. Discussed possible IBS with pt, diet and treatment.  Instructed on home care, med use, follow-up with PCP, and return precautions. Discharged home with detailed written instructions provided.                 ED Course as of 05/27/25 0155   Tue May 27, 2025   0035 Sodium: 139 [DS]   0035 Potassium(!): 3.3 [DS]   0035 BUN: 12 [DS]   0035 Creatinine: 1.01 [DS]   0035 ALT: 15 [DS]   0035 AST: 20 [DS]   0035 WBC(!): 12.37 [DS]   0035 Hemoglobin: 14.1 [DS]   0035 Hematocrit: 41.2 [DS]   0035 Platelet Count: 241 [DS]   0035 Lactic Acid Level: 2.1 [DS]   0035 Magnesium : 1.7 [DS]   0035 Lipase: 23 [DS]   0035 hCG Qualitative, Urine: Negative [DS]   0035 Leukocyte Esterase, UA: Negative [DS]   0035 NITRITE UA: Negative [DS]   0035 Spec Grav UA: 1.010 [DS]      ED Course User Index  [DS] Nikkie Flynn NP                           Clinical Impression:   Final diagnoses:  [R10.84] Generalized abdominal pain (Primary)  [R11.0] Nausea  [R19.7] Diarrhea, unspecified type        ED Disposition Condition    Discharge Stable          ED Prescriptions    None       Follow-up Information       Follow up With Specialties Details Why Contact Info    [facility] - Emergency Department Emergency Medicine  If symptoms worsen [address]  [facility] [address] [phone]                 [1]   Social History  Tobacco Use    Smoking status: Some Days     Types: Vaping with nicotine     Start date: 3/6/2024    Smokeless tobacco: Never   Substance Use Topics    Alcohol use: Never    Drug use: Never        Nikkie Flynn NP  05/27/25 0155

## 2025-05-27 NOTE — ED NOTES
Presents to ER with c/o mid abdominal pain. States ate supper around 5 pm and started hurting after that. Has been nauseated but has not vomited. Has had about 5 episodes of diarrhea. Was seen in this er last week for same complaint. Thinks it is her gallbladder because it seems to happen after eating at times. Took a phenergan about an hour ago.

## 2025-05-27 NOTE — DISCHARGE INSTRUCTIONS
Take ondansetron as needed for nausea. As discussed, your symptoms are indicative of irritable bowel. For abdominal pain and cramping, try IBGard or peppermint oil capsules. If you continue to have similar episodes, follow-up with your PCP for referral to gastroenterologist. Return to the ED for new or worsening symptoms including fever, vomiting or worsening pain.

## 2025-06-04 ENCOUNTER — HOSPITAL ENCOUNTER (OUTPATIENT)
Dept: RADIOLOGY | Facility: HOSPITAL | Age: 20
Discharge: HOME | End: 2025-06-04
Attending: NURSE PRACTITIONER
Payer: COMMERCIAL

## 2025-06-04 DIAGNOSIS — R10.11 RUQ ABDOMINAL PAIN: ICD-10-CM

## 2025-06-04 DIAGNOSIS — R10.2 SUPRAPUBIC PAIN: ICD-10-CM

## 2025-06-04 PROCEDURE — 76705 ECHO EXAM OF ABDOMEN: CPT | Mod: TC

## 2025-06-04 PROCEDURE — 76856 US EXAM PELVIC COMPLETE: CPT | Mod: TC

## 2025-06-04 PROCEDURE — 76856 US EXAM PELVIC COMPLETE: CPT | Mod: 26,,, | Performed by: RADIOLOGY

## 2025-06-04 PROCEDURE — 76705 ECHO EXAM OF ABDOMEN: CPT | Mod: 26,,, | Performed by: RADIOLOGY

## 2025-06-07 ENCOUNTER — HOSPITAL ENCOUNTER (EMERGENCY)
Facility: HOSPITAL | Age: 20
Discharge: HOME | End: 2025-06-07
Payer: COMMERCIAL

## 2025-06-07 VITALS
DIASTOLIC BLOOD PRESSURE: 84 MMHG | TEMPERATURE: 97 F | SYSTOLIC BLOOD PRESSURE: 131 MMHG | HEIGHT: 66 IN | RESPIRATION RATE: 17 BRPM | BODY MASS INDEX: 23.78 KG/M2 | OXYGEN SATURATION: 97 % | WEIGHT: 148 LBS | HEART RATE: 85 BPM

## 2025-06-07 DIAGNOSIS — R10.11 RIGHT UPPER QUADRANT ABDOMINAL PAIN: Primary | ICD-10-CM

## 2025-06-07 LAB
ALBUMIN SERPL BCP-MCNC: 4.5 G/DL (ref 3.5–5)
ALBUMIN/GLOB SERPL: 1.3 {RATIO}
ALP SERPL-CCNC: 33 U/L (ref 40–150)
ALT SERPL W P-5'-P-CCNC: 12 U/L
ANION GAP SERPL CALCULATED.3IONS-SCNC: 13 MMOL/L (ref 7–16)
AST SERPL W P-5'-P-CCNC: 20 U/L (ref 11–45)
B-HCG UR QL: NEGATIVE
BASOPHILS # BLD AUTO: 0.04 K/UL (ref 0–0.2)
BASOPHILS NFR BLD AUTO: 0.5 % (ref 0–1)
BILIRUB SERPL-MCNC: 0.5 MG/DL
BUN SERPL-MCNC: 9 MG/DL (ref 7–19)
BUN/CREAT SERPL: 10 (ref 6–20)
CALCIUM SERPL-MCNC: 9.5 MG/DL (ref 8.4–10.2)
CHLORIDE SERPL-SCNC: 105 MMOL/L (ref 98–107)
CO2 SERPL-SCNC: 23 MMOL/L (ref 22–29)
CREAT SERPL-MCNC: 0.91 MG/DL (ref 0.55–1.02)
CTP QC/QA: YES
DIFFERENTIAL METHOD BLD: ABNORMAL
EGFR (NO RACE VARIABLE) (RUSH/TITUS): 93 ML/MIN/1.73M2
EOSINOPHIL # BLD AUTO: 0.06 K/UL (ref 0–0.5)
EOSINOPHIL NFR BLD AUTO: 0.7 % (ref 1–4)
ERYTHROCYTE [DISTWIDTH] IN BLOOD BY AUTOMATED COUNT: 12.2 % (ref 11.5–14.5)
GLOBULIN SER-MCNC: 3.6 G/DL (ref 2–4)
GLUCOSE SERPL-MCNC: 99 MG/DL (ref 74–100)
HCT VFR BLD AUTO: 38 % (ref 38–47)
HGB BLD-MCNC: 12.8 G/DL (ref 12–16)
IMM GRANULOCYTES # BLD AUTO: 0.02 K/UL (ref 0–0.04)
IMM GRANULOCYTES NFR BLD: 0.2 % (ref 0–0.4)
LIPASE SERPL-CCNC: 24 U/L
LYMPHOCYTES # BLD AUTO: 1.62 K/UL (ref 1–4.8)
LYMPHOCYTES NFR BLD AUTO: 19.7 % (ref 27–41)
MCH RBC QN AUTO: 29.9 PG (ref 27–31)
MCHC RBC AUTO-ENTMCNC: 33.7 G/DL (ref 32–36)
MCV RBC AUTO: 88.8 FL (ref 80–96)
MONOCYTES # BLD AUTO: 0.23 K/UL (ref 0–0.8)
MONOCYTES NFR BLD AUTO: 2.8 % (ref 2–6)
MPC BLD CALC-MCNC: 12.4 FL (ref 9.4–12.4)
NEUTROPHILS # BLD AUTO: 6.24 K/UL (ref 1.8–7.7)
NEUTROPHILS NFR BLD AUTO: 76.1 % (ref 53–65)
NRBC # BLD AUTO: 0 X10E3/UL
NRBC, AUTO (.00): 0 %
PLATELET # BLD AUTO: 262 K/UL (ref 150–400)
POTASSIUM SERPL-SCNC: 4.3 MMOL/L (ref 3.5–5.1)
PROT SERPL-MCNC: 8.1 G/DL (ref 6.4–8.3)
RBC # BLD AUTO: 4.28 M/UL (ref 4.2–5.4)
SODIUM SERPL-SCNC: 137 MMOL/L (ref 136–145)
WBC # BLD AUTO: 8.21 K/UL (ref 4.5–11)

## 2025-06-07 PROCEDURE — 83690 ASSAY OF LIPASE: CPT | Performed by: NURSE PRACTITIONER

## 2025-06-07 PROCEDURE — 99285 EMERGENCY DEPT VISIT HI MDM: CPT | Mod: 25

## 2025-06-07 PROCEDURE — 96361 HYDRATE IV INFUSION ADD-ON: CPT

## 2025-06-07 PROCEDURE — 63600175 PHARM REV CODE 636 W HCPCS: Performed by: NURSE PRACTITIONER

## 2025-06-07 PROCEDURE — 25000003 PHARM REV CODE 250: Performed by: NURSE PRACTITIONER

## 2025-06-07 PROCEDURE — 81025 URINE PREGNANCY TEST: CPT | Performed by: NURSE PRACTITIONER

## 2025-06-07 PROCEDURE — 80053 COMPREHEN METABOLIC PANEL: CPT | Performed by: NURSE PRACTITIONER

## 2025-06-07 PROCEDURE — 96365 THER/PROPH/DIAG IV INF INIT: CPT

## 2025-06-07 PROCEDURE — 85025 COMPLETE CBC W/AUTO DIFF WBC: CPT | Performed by: NURSE PRACTITIONER

## 2025-06-07 PROCEDURE — 36415 COLL VENOUS BLD VENIPUNCTURE: CPT | Performed by: NURSE PRACTITIONER

## 2025-06-07 RX ORDER — DOCUSATE SODIUM 100 MG/1
100 CAPSULE, LIQUID FILLED ORAL 2 TIMES DAILY PRN
Qty: 60 CAPSULE | Refills: 0 | Status: SHIPPED | OUTPATIENT
Start: 2025-06-07

## 2025-06-07 RX ORDER — POLYETHYLENE GLYCOL 3350 17 G/17G
17 POWDER, FOR SOLUTION ORAL DAILY
Qty: 7 EACH | Refills: 0 | Status: SHIPPED | OUTPATIENT
Start: 2025-06-07 | End: 2025-06-14

## 2025-06-07 RX ADMIN — SODIUM CHLORIDE 1000 ML: 9 INJECTION, SOLUTION INTRAVENOUS at 11:06

## 2025-06-07 RX ADMIN — PROMETHAZINE HYDROCHLORIDE 25 MG: 25 INJECTION, SOLUTION INTRAMUSCULAR; INTRAVENOUS at 11:06

## 2025-06-07 NOTE — ED PROVIDER NOTES
Encounter Date: 6/7/2025       History     Chief Complaint   Patient presents with    Abdominal Pain     N/V/D.  Has been to several doctors for complaint but not getting any better.       19-year-old female presents to the emergency department to be evaluated for pain in her right upper abdomen that began 3 weeks ago.  She has also had nausea, vomiting and diarrhea.  She had a gallbladder ultrasound a few days ago that was significant for no acute process.  Her mother also had similar symptoms several years ago and had to have her gallbladder removed which resolved her symptoms.    The history is provided by the patient.   Abdominal Pain  The current episode started several weeks ago. The abdominal pain is located in the RUQ. The other symptoms of the illness include nausea, vomiting and diarrhea. The other symptoms of the illness do not include fever, fatigue, jaundice, melena, dysuria, hematemesis, hematochezia, vaginal discharge or vaginal bleeding.     Review of patient's allergies indicates:  No Known Allergies  Past Medical History:   Diagnosis Date    Anxiety disorder, unspecified     Depression      Past Surgical History:   Procedure Laterality Date    MYRINGOTOMY WITH INSERTION OF VENTILATION TUBE Bilateral 12/28/2021    Procedure: MYRINGOTOMY, WITH TYMPANOSTOMY TUBE INSERTION;  Surgeon: Ethan Willett MD;  Location: [facility] OR;  Service: ENT;  Laterality: Bilateral;    TONSILLECTOMY, ADENOIDECTOMY Bilateral 12/28/2021    Procedure: TONSILLECTOMY AND ADENOIDECTOMY;  Surgeon: Ethan Willett MD;  Location: [facility] OR;  Service: ENT;  Laterality: Bilateral;     Family History   Problem Relation Name Age of Onset    Ovarian cancer Maternal Grandmother      Cancer Maternal Grandmother          ovarian    Hypertension Father      Diabetes Father      Hypertension Mother      Diabetes Mother       Social History[1]  Review of Systems   Constitutional:  Negative for fatigue and fever.   Gastrointestinal:   Positive for abdominal pain, diarrhea, nausea and vomiting. Negative for hematemesis, hematochezia, jaundice and melena.   Genitourinary:  Negative for dysuria, vaginal bleeding and vaginal discharge.   All other systems reviewed and are negative.      Physical Exam     Initial Vitals [06/07/25 1052]   BP Pulse Resp Temp SpO2   131/84 85 17 97.4 °F (36.3 °C) 97 %      MAP       --         Physical Exam    Vitals reviewed.  Constitutional: She appears well-developed and well-nourished.   Neck: Neck supple.   Cardiovascular:  Normal rate and regular rhythm.           Pulmonary/Chest: Breath sounds normal.   Abdominal: Abdomen is soft. Bowel sounds are normal. There is abdominal tenderness (Moderate tenderness right upper abdomen).   Musculoskeletal:      Cervical back: Neck supple.     Neurological: She is alert and oriented to person, place, and time. She has normal strength. GCS score is 15. GCS eye subscore is 4. GCS verbal subscore is 5. GCS motor subscore is 6.   Skin: Skin is warm and dry. Capillary refill takes less than 2 seconds.   Psychiatric: She has a normal mood and affect.         Medical Screening Exam   See Full Note    ED Course   Procedures  Labs Reviewed   COMPREHENSIVE METABOLIC PANEL - Abnormal       Result Value    Sodium 137      Potassium 4.3      Chloride 105      CO2 23      Anion Gap 13      Glucose 99      BUN 9      Creatinine 0.91      BUN/Creatinine Ratio 10      Calcium 9.5      Total Protein 8.1      Albumin 4.5      Globulin 3.6      A/G Ratio 1.3      Bilirubin, Total 0.5      Alk Phos 33 (*)     ALT 12      AST 20      eGFR 93     CBC WITH DIFFERENTIAL - Abnormal    WBC 8.21      RBC 4.28      Hemoglobin 12.8      Hematocrit 38.0      MCV 88.8      MCH 29.9      MCHC 33.7      RDW 12.2      Platelet Count 262      MPV 12.4      Neutrophils % 76.1 (*)     Lymphocytes % 19.7 (*)     Monocytes % 2.8      Eosinophils % 0.7 (*)     Basophils % 0.5      Immature Granulocytes % 0.2       nRBC, Auto 0.0      Neutrophils, Abs 6.24      Lymphocytes, Absolute 1.62      Monocytes, Absolute 0.23      Eosinophils, Absolute 0.06      Basophils, Absolute 0.04      Immature Granulocytes, Absolute 0.02      nRBC, Absolute 0.00      Diff Type Auto     LIPASE - Normal    Lipase 24     CBC W/ AUTO DIFFERENTIAL    Narrative:     The following orders were created for panel order CBC auto differential.  Procedure                               Abnormality         Status                     ---------                               -----------         ------                     CBC with Differential[[id number]]       Abnormal            Final result                 Please view results for these tests on the individual orders.   POCT URINE PREGNANCY    POC Preg Test, Ur Negative       Acceptable Yes            Imaging Results              XR ABDOMEN, ACUTE 2 OR MORE VIEWS WITH CHEST (Final result)  Result time 06/07/25 12:34:27      Final result by Marco Sethi MD (06/07/25 12:34:27)                   Impression:      1. No acute cardiopulmonary process.  2. Moderate stool in the colon may reflect constipation.      Electronically signed by: Marco Sethi MD  Date:    06/07/2025  Time:    12:34               Narrative:    EXAMINATION:  XR ABDOMEN ACUTE 2 OR MORE VIEWS WITH CHEST    CLINICAL HISTORY:  upper abdominal pain, vomiting, diarrhea;    COMPARISON:  Radiograph 10/03/2022, CT abdomen and pelvis 05/27/2025    FINDINGS:  PA chest, 2 upright views, 2 supine views abdomen.    The cardiomediastinal silhouette is not enlarged.  There is no pleural effusion.  The trachea is midline.  The lungs are symmetrically expanded bilaterally without evidence of acute parenchymal process. No large focal consolidation seen.  There is no pneumothorax.  The osseous structures are unremarkable.    No significant air-fluid levels on upright view.  Air and stool is seen within the large bowel and projected over  the rectum noting large amount of stool throughout the colon.  There are no calcifications to suggest nephrolithiasis or cholelithiasis.  No large volume free air or pneumatosis.                                       Medications   sodium chloride 0.9% bolus 1,000 mL 1,000 mL (0 mLs Intravenous Stopped 6/7/25 1244)   promethazine (PHENERGAN) 25 mg in 0.9% NaCl 50 mL IVPB (0 mg Intravenous Stopped 6/7/25 1204)     Medical Decision Making  19-year-old female presents to the emergency department to be evaluated for pain in her right upper abdomen that began 3 weeks ago.  She has also had nausea, vomiting and diarrhea.  She had a gallbladder ultrasound a few days ago that was significant for no acute process.  Her mother also had similar symptoms several years ago and had to have her gallbladder removed which resolved her symptoms.  Labs ordered and reviewed  X-rays ordered, films reviewed as well as the radiologist's interpretation significant for constipation  Gallbladder ultrasound that was done a few days ago reviewed significant for no acute process  Diagnosis:  Right upper quadrant abdominal pain, nausea and vomiting  Prescribed MiraLax and Colace    Amount and/or Complexity of Data Reviewed  Labs: ordered.  Radiology: ordered.    Risk  OTC drugs.                                      Clinical Impression:   Final diagnoses:  [R10.11] Right upper quadrant abdominal pain (Primary)        ED Disposition Condition    Discharge Stable          ED Prescriptions       Medication Sig Dispense Start Date End Date Auth. Provider    polyethylene glycol (GLYCOLAX) 17 gram PwPk Take 17 g by mouth once daily. for 7 days 7 each 6/7/2025 6/14/2025 Angy Lay FNP    docusate sodium (COLACE) 100 MG capsule Take 1 capsule (100 mg total) by mouth 2 (two) times daily as needed. 60 capsule 6/7/2025 -- Angy Lay FNP          Follow-up Information    None            [1]   Social History  Tobacco Use    Smoking status:  Some Days     Types: Vaping with nicotine     Start date: 3/6/2024    Smokeless tobacco: Never   Substance Use Topics    Alcohol use: Never    Drug use: Never        Angy Lay, [facility]  06/07/25 1300

## 2025-06-10 ENCOUNTER — OFFICE VISIT (OUTPATIENT)
Dept: SURGERY | Facility: CLINIC | Age: 20
End: 2025-06-10
Payer: COMMERCIAL

## 2025-06-10 VITALS
DIASTOLIC BLOOD PRESSURE: 46 MMHG | BODY MASS INDEX: 23.78 KG/M2 | SYSTOLIC BLOOD PRESSURE: 109 MMHG | WEIGHT: 148 LBS | HEART RATE: 79 BPM | HEIGHT: 66 IN | OXYGEN SATURATION: 94 %

## 2025-06-10 DIAGNOSIS — R10.11 RIGHT UPPER QUADRANT ABDOMINAL PAIN: ICD-10-CM

## 2025-06-10 PROCEDURE — 1159F MED LIST DOCD IN RCRD: CPT | Mod: CPTII,,, | Performed by: STUDENT IN AN ORGANIZED HEALTH CARE EDUCATION/TRAINING PROGRAM

## 2025-06-10 PROCEDURE — 1160F RVW MEDS BY RX/DR IN RCRD: CPT | Mod: CPTII,,, | Performed by: STUDENT IN AN ORGANIZED HEALTH CARE EDUCATION/TRAINING PROGRAM

## 2025-06-10 PROCEDURE — 3078F DIAST BP <80 MM HG: CPT | Mod: CPTII,,, | Performed by: STUDENT IN AN ORGANIZED HEALTH CARE EDUCATION/TRAINING PROGRAM

## 2025-06-10 PROCEDURE — 99205 OFFICE O/P NEW HI 60 MIN: CPT | Mod: S$PBB,,, | Performed by: STUDENT IN AN ORGANIZED HEALTH CARE EDUCATION/TRAINING PROGRAM

## 2025-06-10 PROCEDURE — 99214 OFFICE O/P EST MOD 30 MIN: CPT | Mod: PBBFAC | Performed by: STUDENT IN AN ORGANIZED HEALTH CARE EDUCATION/TRAINING PROGRAM

## 2025-06-10 PROCEDURE — 3008F BODY MASS INDEX DOCD: CPT | Mod: CPTII,,, | Performed by: STUDENT IN AN ORGANIZED HEALTH CARE EDUCATION/TRAINING PROGRAM

## 2025-06-10 PROCEDURE — 99999 PR PBB SHADOW E&M-EST. PATIENT-LVL IV: CPT | Mod: PBBFAC,,, | Performed by: STUDENT IN AN ORGANIZED HEALTH CARE EDUCATION/TRAINING PROGRAM

## 2025-06-10 PROCEDURE — 3074F SYST BP LT 130 MM HG: CPT | Mod: CPTII,,, | Performed by: STUDENT IN AN ORGANIZED HEALTH CARE EDUCATION/TRAINING PROGRAM

## 2025-06-10 RX ORDER — OMEPRAZOLE 20 MG/1
20 CAPSULE, DELAYED RELEASE ORAL DAILY
Qty: 90 CAPSULE | Refills: 3 | Status: SHIPPED | OUTPATIENT
Start: 2025-06-10 | End: 2026-06-10

## 2025-06-10 RX ORDER — SUCRALFATE 1 G/1
1 TABLET ORAL 4 TIMES DAILY
Qty: 120 TABLET | Refills: 0 | Status: SHIPPED | OUTPATIENT
Start: 2025-06-10

## 2025-06-10 RX ORDER — PROCHLORPERAZINE MALEATE 10 MG
10 TABLET ORAL 3 TIMES DAILY
Qty: 90 TABLET | Refills: 3 | Status: SHIPPED | OUTPATIENT
Start: 2025-06-10

## 2025-06-10 NOTE — PROGRESS NOTES
History & Physical    Subjective     History of Present Illness:  19-year-old  female presents the clinic with complaints of right upper quadrant abdominal pain; this has been going on for several months.  Worse whenever she eats fatty or greasy foods and a associated with nausea and vomiting.  Patient went to the ER and ultrasound of the abdomen was done which showed no gallstones or gallbladder wall thickening; no pericholecystic fluid or Jonas sign; common bile duct nondilated at 3.4 mm.  Past surgical history of tonsils/adenoids, tubes in ears.  Still having occasional nausea    Chief Complaint   Patient presents with    Initial Visit     New patient. RUQ pain       Review of patient's allergies indicates:  No Known Allergies    Current Medications[1]    Past Medical History:   Diagnosis Date    Anxiety disorder, unspecified     Depression      Past Surgical History:   Procedure Laterality Date    MYRINGOTOMY WITH INSERTION OF VENTILATION TUBE Bilateral 12/28/2021    Procedure: MYRINGOTOMY, WITH TYMPANOSTOMY TUBE INSERTION;  Surgeon: Ethan Willett MD;  Location: [facility] OR;  Service: ENT;  Laterality: Bilateral;    TONSILLECTOMY, ADENOIDECTOMY Bilateral 12/28/2021    Procedure: TONSILLECTOMY AND ADENOIDECTOMY;  Surgeon: Ethan Willett MD;  Location: [facility] OR;  Service: ENT;  Laterality: Bilateral;     Family History   Problem Relation Name Age of Onset    Ovarian cancer Maternal Grandmother      Cancer Maternal Grandmother          ovarian    Hypertension Father      Diabetes Father      Hypertension Mother      Diabetes Mother       Social History[2]     Review of Systems:  Review of Systems   Constitutional: Negative.  Negative for fatigue and unexpected weight change.   HENT: Negative.  Negative for trouble swallowing.    Eyes: Negative.    Respiratory: Negative.  Negative for chest tightness and shortness of breath.    Cardiovascular: Negative.  Negative for chest pain.  "  Gastrointestinal:  Positive for abdominal pain, nausea and vomiting. Negative for blood in stool.   Endocrine: Negative.    Genitourinary: Negative.  Negative for hematuria.   Musculoskeletal: Negative.  Negative for back pain and myalgias.   Neurological: Negative.  Negative for dizziness, speech difficulty, weakness and light-headedness.   Psychiatric/Behavioral: Negative.  Negative for agitation and behavioral problems.           Objective     Vital Signs (Most Recent)  Pulse: 79 (06/10/25 1019)  BP: (!) 109/46 (06/10/25 1019)  SpO2: (!) 94 % (06/10/25 1019)  5' 6" (1.676 m)  67.1 kg (148 lb)     Physical Exam:  Physical Exam  Constitutional:       General: She is not in acute distress.     Appearance: Normal appearance.   HENT:      Head: Normocephalic.   Cardiovascular:      Rate and Rhythm: Normal rate.   Pulmonary:      Effort: Pulmonary effort is normal. No respiratory distress.   Abdominal:      General: There is no distension.      Tenderness: There is no abdominal tenderness.   Musculoskeletal:         General: Normal range of motion.   Skin:     General: Skin is warm.      Coloration: Skin is not jaundiced.   Neurological:      General: No focal deficit present.      Mental Status: She is alert and oriented to person, place, and time.      Cranial Nerves: No cranial nerve deficit.              Assessment and Plan   Right upper quadrant abdominal pain    PLAN:    We will get a HIDA scan; we will also prescribe Carafate and Prilosec, as well as Compazine.  If HIDA scan comes back negative, we will set up for referral with GI for EGD              [1]   Current Outpatient Medications   Medication Sig Dispense Refill    docusate sodium (COLACE) 100 MG capsule Take 1 capsule (100 mg total) by mouth 2 (two) times daily as needed. 60 capsule 0    FLUoxetine 10 MG capsule Take 10 mg by mouth once daily.      ibuprofen (ADVIL,MOTRIN) 800 MG tablet Take 1 tablet (800 mg total) by mouth every 8 (eight) hours as " needed for Pain. 30 tablet 11    norgestimate-ethinyl estradioL (ORTHO TRI-CYCLEN LO) 0.18/0.215/0.25 mg-25 mcg tablet Take 1 tablet by mouth once daily. 28 tablet 11    ondansetron (ZOFRAN-ODT) 4 MG TbDL Take 1 tablet (4 mg total) by mouth every 8 (eight) hours as needed (Nausea/vomiting). 10 tablet 0    polyethylene glycol (GLYCOLAX) 17 gram PwPk Take 17 g by mouth once daily. for 7 days 7 each 0     No current facility-administered medications for this visit.   [2]   Social History  Tobacco Use    Smoking status: Some Days     Types: Vaping with nicotine     Start date: 3/6/2024    Smokeless tobacco: Never   Substance Use Topics    Alcohol use: Never    Drug use: Never

## 2025-06-11 ENCOUNTER — PATIENT MESSAGE (OUTPATIENT)
Dept: SURGERY | Facility: CLINIC | Age: 20
End: 2025-06-11
Payer: COMMERCIAL

## 2025-06-14 ENCOUNTER — HOSPITAL ENCOUNTER (EMERGENCY)
Facility: HOSPITAL | Age: 20
Discharge: HOME | End: 2025-06-14
Attending: EMERGENCY MEDICINE
Payer: COMMERCIAL

## 2025-06-14 VITALS
SYSTOLIC BLOOD PRESSURE: 113 MMHG | HEIGHT: 66 IN | HEART RATE: 88 BPM | TEMPERATURE: 98 F | WEIGHT: 145 LBS | BODY MASS INDEX: 23.3 KG/M2 | DIASTOLIC BLOOD PRESSURE: 69 MMHG | RESPIRATION RATE: 17 BRPM | OXYGEN SATURATION: 98 %

## 2025-06-14 DIAGNOSIS — R10.9 CHRONIC ABDOMINAL PAIN: Primary | ICD-10-CM

## 2025-06-14 DIAGNOSIS — R63.4 WEIGHT LOSS: ICD-10-CM

## 2025-06-14 DIAGNOSIS — R11.2 NAUSEA AND VOMITING, UNSPECIFIED VOMITING TYPE: ICD-10-CM

## 2025-06-14 DIAGNOSIS — G89.29 CHRONIC ABDOMINAL PAIN: Primary | ICD-10-CM

## 2025-06-14 DIAGNOSIS — F12.90 MARIJUANA USE: ICD-10-CM

## 2025-06-14 LAB
ALBUMIN SERPL BCP-MCNC: 4.2 G/DL (ref 3.5–5)
ALBUMIN/GLOB SERPL: 1.2 {RATIO}
ALP SERPL-CCNC: 30 U/L (ref 40–150)
ALT SERPL W P-5'-P-CCNC: 10 U/L
AMPHET UR QL SCN: NEGATIVE
ANION GAP SERPL CALCULATED.3IONS-SCNC: 13 MMOL/L (ref 7–16)
AST SERPL W P-5'-P-CCNC: 18 U/L (ref 11–45)
B-HCG UR QL: NEGATIVE
BARBITURATES UR QL SCN: NEGATIVE
BASOPHILS # BLD AUTO: 0.03 K/UL (ref 0–0.2)
BASOPHILS NFR BLD AUTO: 0.3 % (ref 0–1)
BENZODIAZ METAB UR QL SCN: NEGATIVE
BILIRUB SERPL-MCNC: 0.3 MG/DL
BILIRUB UR QL STRIP: NEGATIVE
BUN SERPL-MCNC: 7 MG/DL (ref 7–19)
BUN/CREAT SERPL: 8 (ref 6–20)
CALCIUM SERPL-MCNC: 9.7 MG/DL (ref 8.4–10.2)
CANNABINOIDS UR QL SCN: POSITIVE
CHLORIDE SERPL-SCNC: 109 MMOL/L (ref 98–107)
CLARITY UR: CLEAR
CO2 SERPL-SCNC: 21 MMOL/L (ref 22–29)
COCAINE UR QL SCN: NEGATIVE
COLOR UR: COLORLESS
CREAT SERPL-MCNC: 0.83 MG/DL (ref 0.55–1.02)
CTP QC/QA: YES
DIFFERENTIAL METHOD BLD: ABNORMAL
EGFR (NO RACE VARIABLE) (RUSH/TITUS): 104 ML/MIN/1.73M2
EOSINOPHIL # BLD AUTO: 0.21 K/UL (ref 0–0.5)
EOSINOPHIL NFR BLD AUTO: 2.2 % (ref 1–4)
ERYTHROCYTE [DISTWIDTH] IN BLOOD BY AUTOMATED COUNT: 12.1 % (ref 11.5–14.5)
GLOBULIN SER-MCNC: 3.6 G/DL (ref 2–4)
GLUCOSE SERPL-MCNC: 83 MG/DL (ref 74–100)
GLUCOSE UR STRIP-MCNC: NORMAL MG/DL
HCT VFR BLD AUTO: 33.3 % (ref 38–47)
HGB BLD-MCNC: 11.4 G/DL (ref 12–16)
IMM GRANULOCYTES # BLD AUTO: 0.02 K/UL (ref 0–0.04)
IMM GRANULOCYTES NFR BLD: 0.2 % (ref 0–0.4)
KETONES UR STRIP-SCNC: NEGATIVE MG/DL
LEUKOCYTE ESTERASE UR QL STRIP: NEGATIVE
LIPASE SERPL-CCNC: 27 U/L
LYMPHOCYTES # BLD AUTO: 3.94 K/UL (ref 1–4.8)
LYMPHOCYTES NFR BLD AUTO: 40.9 % (ref 27–41)
MAGNESIUM SERPL-MCNC: 1.8 MG/DL (ref 1.7–2.2)
MCH RBC QN AUTO: 30.2 PG (ref 27–31)
MCHC RBC AUTO-ENTMCNC: 34.2 G/DL (ref 32–36)
MCV RBC AUTO: 88.3 FL (ref 80–96)
MONOCYTES # BLD AUTO: 0.39 K/UL (ref 0–0.8)
MONOCYTES NFR BLD AUTO: 4 % (ref 2–6)
MPC BLD CALC-MCNC: 12.4 FL (ref 9.4–12.4)
MUCOUS, UA: ABNORMAL /LPF
NEUTROPHILS # BLD AUTO: 5.04 K/UL (ref 1.8–7.7)
NEUTROPHILS NFR BLD AUTO: 52.4 % (ref 53–65)
NITRITE UR QL STRIP: NEGATIVE
NRBC # BLD AUTO: 0 X10E3/UL
NRBC, AUTO (.00): 0 %
OPIATES UR QL SCN: NEGATIVE
PCP UR QL SCN: NEGATIVE
PH UR STRIP: 6 PH UNITS
PLATELET # BLD AUTO: 242 K/UL (ref 150–400)
POTASSIUM SERPL-SCNC: 3.8 MMOL/L (ref 3.5–5.1)
PROT SERPL-MCNC: 7.8 G/DL (ref 6.4–8.3)
PROT UR QL STRIP: NEGATIVE
RBC # BLD AUTO: 3.77 M/UL (ref 4.2–5.4)
RBC # UR STRIP: ABNORMAL /UL
RBC #/AREA URNS HPF: 1 /HPF
SODIUM SERPL-SCNC: 139 MMOL/L (ref 136–145)
SP GR UR STRIP: 1.01
SQUAMOUS #/AREA URNS LPF: ABNORMAL /HPF
UROBILINOGEN UR STRIP-ACNC: NORMAL MG/DL
WBC # BLD AUTO: 9.63 K/UL (ref 4.5–11)
WBC #/AREA URNS HPF: 1 /HPF

## 2025-06-14 PROCEDURE — 81025 URINE PREGNANCY TEST: CPT | Performed by: EMERGENCY MEDICINE

## 2025-06-14 PROCEDURE — 96360 HYDRATION IV INFUSION INIT: CPT

## 2025-06-14 PROCEDURE — 25000003 PHARM REV CODE 250: Performed by: EMERGENCY MEDICINE

## 2025-06-14 PROCEDURE — 83690 ASSAY OF LIPASE: CPT | Performed by: EMERGENCY MEDICINE

## 2025-06-14 PROCEDURE — 85025 COMPLETE CBC W/AUTO DIFF WBC: CPT | Performed by: EMERGENCY MEDICINE

## 2025-06-14 PROCEDURE — 81001 URINALYSIS AUTO W/SCOPE: CPT | Performed by: EMERGENCY MEDICINE

## 2025-06-14 PROCEDURE — 99284 EMERGENCY DEPT VISIT MOD MDM: CPT | Mod: 25

## 2025-06-14 PROCEDURE — 80307 DRUG TEST PRSMV CHEM ANLYZR: CPT | Performed by: EMERGENCY MEDICINE

## 2025-06-14 PROCEDURE — 83735 ASSAY OF MAGNESIUM: CPT | Performed by: EMERGENCY MEDICINE

## 2025-06-14 PROCEDURE — 80053 COMPREHEN METABOLIC PANEL: CPT | Performed by: EMERGENCY MEDICINE

## 2025-06-14 RX ADMIN — SODIUM CHLORIDE 1000 ML: 9 INJECTION, SOLUTION INTRAVENOUS at 09:06

## 2025-06-15 NOTE — ED PROVIDER NOTES
Encounter Date: 6/14/2025    SCRIBE #1 NOTE: I, Valerie Huggins, am scribing for, and in the presence of,  Carlo Shcmidt MD. I have scribed the entire note.       History     Chief Complaint   Patient presents with    Chills    Jaw Pain     Pt was [place] of the mall today at 1930 and her legs gave out from under her. Her friend noticed some signs like leg weakness, jaw pain, and slurring some of her words and wanted her to get checked out.      Reginald Castro is a 19 y.o. female presenting to the ED with c/o leg weakness has been off and on for 2 months as well as bilateral jaw pain that began this evening. PT reports that she has been vomiting for 2 months. She reports that the last time she vomited was last week after being given prescribed phenergan. PT states that she has only been eating yogurt and mash potatoes and drinking Gatorade and water. PT denies vomiting, diarrhea, fever, abdominal pain, constipation, dysuria and sore throat. PT reports that she has lost 20lbs within the past 2 months. PT has Hx of anxiety and depression.     The history is provided by the patient. No  was used.     Review of patient's allergies indicates:  No Known Allergies  Past Medical History:   Diagnosis Date    Anxiety disorder, unspecified     Depression      Past Surgical History:   Procedure Laterality Date    MYRINGOTOMY WITH INSERTION OF VENTILATION TUBE Bilateral 12/28/2021    Procedure: MYRINGOTOMY, WITH TYMPANOSTOMY TUBE INSERTION;  Surgeon: Ethan Willett MD;  Location: [facility] OR;  Service: ENT;  Laterality: Bilateral;    TONSILLECTOMY, ADENOIDECTOMY Bilateral 12/28/2021    Procedure: TONSILLECTOMY AND ADENOIDECTOMY;  Surgeon: Ethan Willett MD;  Location: [facility] OR;  Service: ENT;  Laterality: Bilateral;     Family History   Problem Relation Name Age of Onset    Ovarian cancer Maternal Grandmother      Cancer Maternal Grandmother          ovarian    Hypertension Father      Diabetes  Father      Hypertension Mother      Diabetes Mother       Social History[1]  Review of Systems   Constitutional:  Negative for fever.   HENT:  Negative for sore throat.    Gastrointestinal:  Negative for abdominal pain, constipation, diarrhea and vomiting.   Genitourinary:  Negative for dysuria.   Musculoskeletal:         PT reports jaw pain.   Neurological:  Positive for weakness.   All other systems reviewed and are negative.      Physical Exam     Initial Vitals [06/14/25 2042]   BP Pulse Resp Temp SpO2   113/69 88 17 98.2 °F (36.8 °C) 98 %      MAP       --         Physical Exam    Nursing note and vitals reviewed.  HENT:   Head: Normocephalic and atraumatic. Mouth/Throat: Oropharynx is clear and moist.   Eyes: Pupils are equal, round, and reactive to light.   Neck: Neck supple.   Normal range of motion.  Cardiovascular:  Normal rate and regular rhythm.           Pulmonary/Chest: Effort normal and breath sounds normal.   Abdominal: Abdomen is soft. She exhibits no distension.   Musculoskeletal:         General: Normal range of motion.      Cervical back: Normal range of motion and neck supple.     Neurological: She is alert.   Skin: Skin is warm. Capillary refill takes less than 2 seconds.   Psychiatric: She has a normal mood and affect.         ED Course   Procedures  Labs Reviewed   COMPREHENSIVE METABOLIC PANEL - Abnormal       Result Value    Sodium 139      Potassium 3.8      Chloride 109 (*)     CO2 21 (*)     Anion Gap 13      Glucose 83      BUN 7      Creatinine 0.83      BUN/Creatinine Ratio 8      Calcium 9.7      Total Protein 7.8      Albumin 4.2      Globulin 3.6      A/G Ratio 1.2      Bilirubin, Total 0.3      Alk Phos 30 (*)     ALT 10      AST 18      eGFR 104     URINALYSIS, REFLEX TO URINE CULTURE - Abnormal    Color, UA Colorless      Clarity, UA Clear      pH, UA 6.0      Leukocytes, UA Negative      Nitrites, UA Negative      Protein, UA Negative      Glucose, UA Normal      Ketones, UA  Negative      Urobilinogen, UA Normal      Bilirubin, UA Negative      Blood, UA Small (*)     Specific Gravity, UA 1.006     CBC WITH DIFFERENTIAL - Abnormal    WBC 9.63      RBC 3.77 (*)     Hemoglobin 11.4 (*)     Hematocrit 33.3 (*)     MCV 88.3      MCH 30.2      MCHC 34.2      RDW 12.1      Platelet Count 242      MPV 12.4      Neutrophils % 52.4 (*)     Lymphocytes % 40.9      Monocytes % 4.0      Eosinophils % 2.2      Basophils % 0.3      Immature Granulocytes % 0.2      nRBC, Auto 0.0      Neutrophils, Abs 5.04      Lymphocytes, Absolute 3.94      Monocytes, Absolute 0.39      Eosinophils, Absolute 0.21      Basophils, Absolute 0.03      Immature Granulocytes, Absolute 0.02      nRBC, Absolute 0.00      Diff Type Auto     URINALYSIS, MICROSCOPIC - Abnormal    WBC, UA 1      RBC, UA 1      Squamous Epithelial Cells, UA Occasional (*)     Mucous Occasional (*)    DRUG SCREEN, URINE (BEAKER) - Abnormal    Barbiturates, Urine Negative      Benzodiazepine, Urine Negative      Opiates, Urine Negative      Phencyclidine, Urine Negative      Amphetamine, Urine Negative      Cannabinoid, Urine Positive (*)     Cocaine, Urine Negative      Narrative:     This screen includes the following classes of drugs at the listed cut-off:    Benzodiazepines 200 ng/ml  Cocaine metabolite 300 ng/ml  Opiates 2000 ng/ml  Barbiturates 200 ng/ml  Amphetamines 500 ng/ml  Marijuana metabs (THC) 50 ng/ml  Phencyclidine (PCP) 25 ng/ml    This is a screening test. If results do not correlate with clinical presentation, then a confirmatory send out test is advised.   LIPASE - Normal    Lipase 27     MAGNESIUM - Normal    Magnesium 1.8     CBC W/ AUTO DIFFERENTIAL    Narrative:     The following orders were created for panel order CBC auto differential.  Procedure                               Abnormality         Status                     ---------                               -----------         ------                     CBC with  Differential[[id number]]       Abnormal            Final result                 Please view results for these tests on the individual orders.   POCT URINE PREGNANCY    POC Preg Test, Ur Negative       Acceptable Yes            Imaging Results    None          Medications   sodium chloride 0.9% bolus 1,000 mL 1,000 mL (1,000 mLs Intravenous New Bag 6/14/25 2118)     Medical Decision Making  Amount and/or Complexity of Data Reviewed  Labs: ordered.              Attending Attestation:           Physician Attestation for Scribe:  Physician Attestation Statement for Scribe #1: I, Carlo Schmidt MD, reviewed documentation, as scribed by Valerie Huggins in my presence, and it is both accurate and complete.             ED Course as of 06/14/25 2251   Sat Jun 14, 2025 2100 Medical decision-making:  Differential diagnosis includes generalized weakness, weight loss, anorexia, gallbladder dysfunction, IBS, dehydration, anxiety.  All testing ordered and interpreted by me. [BB]   2138 Urinalysis is normal.  Pregnancy test is negative. [BB]   2216 CBC is normal except mild anemia. [BB]   2223 CMP is normal. [BB]   2243 Urine drug screen is positive for marijuana. [BB]      ED Course User Index  [BB] Carlo Schmidt MD                           Clinical Impression:  Final diagnoses:  [R10.9, G89.29] Chronic abdominal pain (Primary)  [R63.4] Weight loss  [R11.2] Nausea and vomiting, unspecified vomiting type  [F12.90] Marijuana use          ED Disposition Condition    Discharge Stable          ED Prescriptions    None       Follow-up Information    None                [1]   Social History  Tobacco Use    Smoking status: Some Days     Types: Vaping with nicotine     Start date: 3/6/2024    Smokeless tobacco: Never   Vaping Use    Vaping status: Every Day   Substance Use Topics    Alcohol use: Never    Drug use: Never        Carlo Schmidt MD  06/14/25 2251

## 2025-06-15 NOTE — DISCHARGE INSTRUCTIONS
Continue current medications as prescribed.  Follow up in clinic with General surgery as previously scheduled.  Follow up in clinic with gastroenterology.  Return to emergency department for any worsening or further problems.

## 2025-06-16 ENCOUNTER — HOSPITAL ENCOUNTER (EMERGENCY)
Facility: HOSPITAL | Age: 20
Discharge: HOME | End: 2025-06-16
Payer: COMMERCIAL

## 2025-06-16 VITALS
TEMPERATURE: 98 F | HEART RATE: 90 BPM | BODY MASS INDEX: 23.3 KG/M2 | WEIGHT: 145 LBS | OXYGEN SATURATION: 99 % | RESPIRATION RATE: 18 BRPM | HEIGHT: 66 IN | SYSTOLIC BLOOD PRESSURE: 121 MMHG | DIASTOLIC BLOOD PRESSURE: 70 MMHG

## 2025-06-16 DIAGNOSIS — M26.609 TMJ (TEMPOROMANDIBULAR JOINT DISORDER): Primary | ICD-10-CM

## 2025-06-16 DIAGNOSIS — R00.0 TACHYCARDIA: ICD-10-CM

## 2025-06-16 LAB
ALBUMIN SERPL BCP-MCNC: 4 G/DL (ref 3.5–5)
ALBUMIN/GLOB SERPL: 1.2 {RATIO}
ALP SERPL-CCNC: 30 U/L (ref 40–150)
ALT SERPL W P-5'-P-CCNC: 9 U/L
AMPHET UR QL SCN: NEGATIVE
ANION GAP SERPL CALCULATED.3IONS-SCNC: 15 MMOL/L (ref 7–16)
AST SERPL W P-5'-P-CCNC: 15 U/L (ref 11–45)
BACTERIA #/AREA URNS HPF: ABNORMAL /HPF
BARBITURATES UR QL SCN: NEGATIVE
BASOPHILS # BLD AUTO: 0.04 K/UL (ref 0–0.2)
BASOPHILS NFR BLD AUTO: 0.4 % (ref 0–1)
BENZODIAZ METAB UR QL SCN: NEGATIVE
BILIRUB SERPL-MCNC: 0.2 MG/DL
BILIRUB UR QL STRIP: NEGATIVE
BUN SERPL-MCNC: 5 MG/DL (ref 7–19)
BUN/CREAT SERPL: 5 (ref 6–20)
CALCIUM SERPL-MCNC: 9.1 MG/DL (ref 8.4–10.2)
CANNABINOIDS UR QL SCN: POSITIVE
CHLORIDE SERPL-SCNC: 107 MMOL/L (ref 98–107)
CK SERPL-CCNC: 103 U/L (ref 29–168)
CLARITY UR: CLEAR
CO2 SERPL-SCNC: 19 MMOL/L (ref 22–29)
COCAINE UR QL SCN: NEGATIVE
COLOR UR: YELLOW
CREAT SERPL-MCNC: 0.96 MG/DL (ref 0.55–1.02)
DIFFERENTIAL METHOD BLD: ABNORMAL
EGFR (NO RACE VARIABLE) (RUSH/TITUS): 88 ML/MIN/1.73M2
EOSINOPHIL # BLD AUTO: 0.24 K/UL (ref 0–0.5)
EOSINOPHIL NFR BLD AUTO: 2.2 % (ref 1–4)
ERYTHROCYTE [DISTWIDTH] IN BLOOD BY AUTOMATED COUNT: 12.3 % (ref 11.5–14.5)
ETHANOL SERPL-MCNC: <10 MG/DL
GLOBULIN SER-MCNC: 3.3 G/DL (ref 2–4)
GLUCOSE SERPL-MCNC: 143 MG/DL (ref 74–100)
GLUCOSE UR STRIP-MCNC: NEGATIVE MG/DL
HCG SERUM QUALITATIVE: NEGATIVE
HCT VFR BLD AUTO: 33.8 % (ref 38–47)
HGB BLD-MCNC: 11.7 G/DL (ref 12–16)
IMM GRANULOCYTES # BLD AUTO: 0.03 K/UL (ref 0–0.04)
IMM GRANULOCYTES NFR BLD: 0.3 % (ref 0–0.4)
KETONES UR STRIP-SCNC: NEGATIVE MG/DL
LACTATE SERPL-SCNC: 2.3 MMOL/L (ref 0.5–2.2)
LACTATE SERPL-SCNC: 5 MMOL/L (ref 0.5–2.2)
LEUKOCYTE ESTERASE UR QL STRIP: NEGATIVE
LYMPHOCYTES # BLD AUTO: 3.86 K/UL (ref 1–4.8)
LYMPHOCYTES NFR BLD AUTO: 34.7 % (ref 27–41)
MAGNESIUM SERPL-MCNC: 1.8 MG/DL (ref 1.7–2.2)
MCH RBC QN AUTO: 30.3 PG (ref 27–31)
MCHC RBC AUTO-ENTMCNC: 34.6 G/DL (ref 32–36)
MCV RBC AUTO: 87.6 FL (ref 80–96)
MONOCYTES # BLD AUTO: 0.47 K/UL (ref 0–0.8)
MONOCYTES NFR BLD AUTO: 4.2 % (ref 2–6)
MPC BLD CALC-MCNC: 11.8 FL (ref 9.4–12.4)
NEUTROPHILS # BLD AUTO: 6.48 K/UL (ref 1.8–7.7)
NEUTROPHILS NFR BLD AUTO: 58.2 % (ref 53–65)
NITRITE UR QL STRIP: NEGATIVE
NRBC # BLD AUTO: 0 X10E3/UL
NRBC, AUTO (.00): 0 %
OPIATES UR QL SCN: NEGATIVE
PCP UR QL SCN: NEGATIVE
PH UR STRIP: 6 PH UNITS
PLATELET # BLD AUTO: 259 K/UL (ref 150–400)
POTASSIUM SERPL-SCNC: 4.2 MMOL/L (ref 3.5–5.1)
PROT SERPL-MCNC: 7.3 G/DL (ref 6.4–8.3)
PROT UR QL STRIP: NEGATIVE
RBC # BLD AUTO: 3.86 M/UL (ref 4.2–5.4)
RBC # UR STRIP: ABNORMAL /UL
RBC #/AREA URNS HPF: ABNORMAL /HPF
SODIUM SERPL-SCNC: 137 MMOL/L (ref 136–145)
SP GR UR STRIP: 1.01
SQUAMOUS #/AREA URNS LPF: ABNORMAL /LPF
TSH SERPL DL<=0.005 MIU/L-ACNC: 1.65 UIU/ML (ref 0.35–4.94)
URATE SERPL-MCNC: 3.5 MG/DL (ref 2.6–6)
UROBILINOGEN UR STRIP-ACNC: 0.2 MG/DL
WBC # BLD AUTO: 11.12 K/UL (ref 4.5–11)
WBC #/AREA URNS HPF: ABNORMAL /HPF

## 2025-06-16 PROCEDURE — 82550 ASSAY OF CK (CPK): CPT | Performed by: NURSE PRACTITIONER

## 2025-06-16 PROCEDURE — 96375 TX/PRO/DX INJ NEW DRUG ADDON: CPT

## 2025-06-16 PROCEDURE — 96374 THER/PROPH/DIAG INJ IV PUSH: CPT

## 2025-06-16 PROCEDURE — 36415 COLL VENOUS BLD VENIPUNCTURE: CPT | Performed by: NURSE PRACTITIONER

## 2025-06-16 PROCEDURE — 63600175 PHARM REV CODE 636 W HCPCS: Performed by: NURSE PRACTITIONER

## 2025-06-16 PROCEDURE — 80053 COMPREHEN METABOLIC PANEL: CPT | Performed by: NURSE PRACTITIONER

## 2025-06-16 PROCEDURE — 99284 EMERGENCY DEPT VISIT MOD MDM: CPT | Mod: 25

## 2025-06-16 PROCEDURE — 93005 ELECTROCARDIOGRAM TRACING: CPT

## 2025-06-16 PROCEDURE — 83605 ASSAY OF LACTIC ACID: CPT | Performed by: NURSE PRACTITIONER

## 2025-06-16 PROCEDURE — 84703 CHORIONIC GONADOTROPIN ASSAY: CPT | Performed by: NURSE PRACTITIONER

## 2025-06-16 PROCEDURE — 83735 ASSAY OF MAGNESIUM: CPT | Performed by: NURSE PRACTITIONER

## 2025-06-16 PROCEDURE — 25000003 PHARM REV CODE 250: Performed by: NURSE PRACTITIONER

## 2025-06-16 PROCEDURE — 81003 URINALYSIS AUTO W/O SCOPE: CPT | Performed by: NURSE PRACTITIONER

## 2025-06-16 PROCEDURE — 85025 COMPLETE CBC W/AUTO DIFF WBC: CPT | Performed by: NURSE PRACTITIONER

## 2025-06-16 PROCEDURE — 99284 EMERGENCY DEPT VISIT MOD MDM: CPT | Mod: GF,,, | Performed by: NURSE PRACTITIONER

## 2025-06-16 PROCEDURE — 84550 ASSAY OF BLOOD/URIC ACID: CPT | Performed by: NURSE PRACTITIONER

## 2025-06-16 PROCEDURE — 80307 DRUG TEST PRSMV CHEM ANLYZR: CPT | Performed by: NURSE PRACTITIONER

## 2025-06-16 PROCEDURE — 82077 ASSAY SPEC XCP UR&BREATH IA: CPT | Performed by: NURSE PRACTITIONER

## 2025-06-16 PROCEDURE — 96361 HYDRATE IV INFUSION ADD-ON: CPT

## 2025-06-16 RX ORDER — KETOROLAC TROMETHAMINE 30 MG/ML
15 INJECTION, SOLUTION INTRAMUSCULAR; INTRAVENOUS
Status: COMPLETED | OUTPATIENT
Start: 2025-06-16 | End: 2025-06-16

## 2025-06-16 RX ORDER — ORPHENADRINE CITRATE 30 MG/ML
30 INJECTION INTRAMUSCULAR; INTRAVENOUS
Status: COMPLETED | OUTPATIENT
Start: 2025-06-16 | End: 2025-06-16

## 2025-06-16 RX ADMIN — SODIUM CHLORIDE 500 ML: 9 INJECTION, SOLUTION INTRAVENOUS at 07:06

## 2025-06-16 RX ADMIN — KETOROLAC TROMETHAMINE 15 MG: 30 INJECTION, SOLUTION INTRAMUSCULAR at 06:06

## 2025-06-16 RX ADMIN — SODIUM CHLORIDE 1000 ML: 9 INJECTION, SOLUTION INTRAVENOUS at 06:06

## 2025-06-16 RX ADMIN — ORPHENADRINE CITRATE 30 MG: 60 INJECTION INTRAMUSCULAR; INTRAVENOUS at 06:06

## 2025-06-16 NOTE — ED TRIAGE NOTES
Pt presents with c/o bilat jaw pain and great difficulty moving her lower jaw. Had an episode of this two days ago and was seen at [facility] ED and the symptoms subsided until 20 min PTA for this visit. Pt presents crying.

## 2025-06-16 NOTE — ED PROVIDER NOTES
Encounter Date: 6/16/2025       History     Chief Complaint   Patient presents with    Jaw Pain     Patient presents to the ED with complaints of feeling like her jaw is locking up along with pain in both sides of her jaw. States she is also having muscle twitches in her legs for the past week. Denies any recent injury or cuts/abrasions.     The history is provided by the patient.     Review of patient's allergies indicates:  No Known Allergies  Past Medical History:   Diagnosis Date    Anxiety disorder, unspecified     Depression      Past Surgical History:   Procedure Laterality Date    MYRINGOTOMY WITH INSERTION OF VENTILATION TUBE Bilateral 12/28/2021    Procedure: MYRINGOTOMY, WITH TYMPANOSTOMY TUBE INSERTION;  Surgeon: Ethan Willett MD;  Location: [facility] OR;  Service: ENT;  Laterality: Bilateral;    TONSILLECTOMY, ADENOIDECTOMY Bilateral 12/28/2021    Procedure: TONSILLECTOMY AND ADENOIDECTOMY;  Surgeon: Ethan Willett MD;  Location: [facility] OR;  Service: ENT;  Laterality: Bilateral;     Family History   Problem Relation Name Age of Onset    Ovarian cancer Maternal Grandmother      Cancer Maternal Grandmother          ovarian    Hypertension Father      Diabetes Father      Hypertension Mother      Diabetes Mother       Social History[1]  Review of Systems   Constitutional: Negative.    HENT:          Jaw pain   Respiratory: Negative.     Cardiovascular: Negative.    Musculoskeletal:  Positive for arthralgias and myalgias.   Neurological:  Positive for weakness.   Psychiatric/Behavioral:  The patient is nervous/anxious.    All other systems reviewed and are negative.      Physical Exam     Initial Vitals [06/16/25 1756]   BP Pulse Resp Temp SpO2   (!) 142/89 (!) 128 18 97.7 °F (36.5 °C) 98 %      MAP       --         Physical Exam    Vitals reviewed.  Constitutional: She appears well-developed and well-nourished.   Cardiovascular:  Regular rhythm, normal heart sounds and intact distal pulses.    Tachycardia present.         Pulmonary/Chest: Breath sounds normal.   Musculoskeletal:         General: Tenderness (bilateral jaw) present. Normal range of motion.     Neurological: She is alert and oriented to person, place, and time. She has normal strength. GCS score is 15. GCS eye subscore is 4. GCS verbal subscore is 5. GCS motor subscore is 6.   Skin: Skin is warm and dry. Capillary refill takes less than 2 seconds.   Psychiatric: Her speech is normal and behavior is normal. Judgment and thought content normal. Her mood appears anxious. Cognition and memory are normal.         Medical Screening Exam   See Full Note    ED Course   Procedures  Labs Reviewed   COMPREHENSIVE METABOLIC PANEL - Abnormal       Result Value    Sodium 137      Potassium 4.2      Chloride 107      CO2 19 (*)     Anion Gap 15      Glucose 143 (*)     BUN 5 (*)     Creatinine 0.96      BUN/Creatinine Ratio 5 (*)     Calcium 9.1      Total Protein 7.3      Albumin 4.0      Globulin 3.3      A/G Ratio 1.2      Bilirubin, Total 0.2      Alk Phos 30 (*)     ALT 9      AST 15      eGFR 88     URINALYSIS, REFLEX TO URINE CULTURE - Abnormal    Color, UA Yellow      Clarity, UA Clear      pH, UA 6.0      Leukocytes, UA Negative      Nitrites, UA Negative      Protein, UA Negative      Glucose, UA Negative      Ketones, UA Negative      Urobilinogen, UA 0.2      Bilirubin, UA Negative      Blood, UA Trace-Intact (*)     Specific Rhodes, UA 1.010     DRUG SCREEN, URINE (BEAKER) - Abnormal    Barbiturates, Urine Negative      Benzodiazepine, Urine Negative      Opiates, Urine Negative      Phencyclidine, Urine Negative      Amphetamine, Urine Negative      Cannabinoid, Urine Positive (*)     Cocaine, Urine Negative      Narrative:     This screen includes the following classes of drugs at the listed cut-off:    Benzodiazepines 200 ng/ml  Cocaine metabolite 300 ng/ml  Opiates 2000 ng/ml  Barbiturates 200 ng/ml  Amphetamines 500 ng/ml  Marijuana  metabs (THC) 50 ng/ml  Phencyclidine (PCP) 25 ng/ml    This is a screening test. If results do not correlate with clinical presentation, then a confirmatory send out test is advised.   CBC WITH DIFFERENTIAL - Abnormal    WBC 11.12 (*)     RBC 3.86 (*)     Hemoglobin 11.7 (*)     Hematocrit 33.8 (*)     MCV 87.6      MCH 30.3      MCHC 34.6      RDW 12.3      Platelet Count 259      MPV 11.8      Neutrophils % 58.2      Lymphocytes % 34.7      Monocytes % 4.2      Eosinophils % 2.2      Basophils % 0.4      Immature Granulocytes % 0.3      nRBC, Auto 0.0      Neutrophils, Abs 6.48      Lymphocytes, Absolute 3.86      Monocytes, Absolute 0.47      Eosinophils, Absolute 0.24      Basophils, Absolute 0.04      Immature Granulocytes, Absolute 0.03      nRBC, Absolute 0.00      Diff Type Auto     LACTIC ACID, PLASMA - Abnormal    Lactic Acid 5.0 (*)    LACTIC ACID, PLASMA - Abnormal    Lactic Acid 2.3 (*)    URINALYSIS, MICROSCOPIC - Abnormal    WBC, UA 0-5      RBC, UA 0-3      Bacteria, UA Occasional (*)     Squamous Epithelial Cells, UA Occasional (*)    ALCOHOL,MEDICAL (ETHANOL) - Normal    Ethanol <10     HCG, SERUM, QUALITATIVE - Normal    Pregnancy, Serum Negative     CK - Normal         URIC ACID - Normal    Uric Acid 3.5     TSH - Normal    TSH 1.653     MAGNESIUM - Normal    Magnesium 1.8     CBC W/ AUTO DIFFERENTIAL    Narrative:     The following orders were created for panel order CBC auto differential.  Procedure                               Abnormality         Status                     ---------                               -----------         ------                     CBC with Differential[[id number]]       Abnormal            Final result                 Please view results for these tests on the individual orders.          Imaging Results    None          Medications   sodium chloride 0.9% bolus 1,000 mL 1,000 mL (0 mLs Intravenous Stopped 6/16/25 1944)   ketorolac injection 15 mg (15 mg  Intravenous Given 6/16/25 1859)   orphenadrine injection 30 mg (30 mg Intravenous Given 6/16/25 1859)   sodium chloride 0.9% bolus 500 mL 500 mL (0 mLs Intravenous Stopped 6/16/25 2003)     Medical Decision Making  MDM    Patient presents for emergent evaluation of acute bilateral jaw pain that poses a threat to life and/or bodily function.    In the ED patient found to have acute TMJ.    I ordered labs and personally reviewed them.  Labs significant for WBC 11.12, H/H 11.7/33.8, Platelets 259, Na 137, K 4.2, Cl 107, BUN 5, Creat 0.96, Calcium 9.1, Magnesium 1.8, Uric acid 3.5, , ETOH <10, Lactic 5.0, negative serum pregnancy, no infection in urine, positive for marijuana, second lactic after 1500 ml NS fluids was 2.3  I ordered EKG and personally reviewed it.  EKG significant for NSR rate of 97.      Discharge MDM  I discussed the patient presentation labs, EKG findings with Dr. Hernandez at [facility], he agreed with treatment plan and agreed that if patient's lactic was trending down significantly along with heart rate coming down, patient could be discharged home.   Patient was managed in the ED with IV fluids, Toradol and Norflex.    The response to treatment was good.    Patient was discharged in stable condition.  Detailed return precautions discussed.    Amount and/or Complexity of Data Reviewed  Labs: ordered.    Risk  Prescription drug management.                                      Clinical Impression:   Final diagnoses:  [R00.0] Tachycardia  [M26.609] TMJ (temporomandibular joint disorder) (Primary)        ED Disposition Condition    Discharge Stable          ED Prescriptions    None       Follow-up Information    None            [1]   Social History  Tobacco Use    Smoking status: Some Days     Types: Vaping with nicotine     Start date: 3/6/2024    Smokeless tobacco: Never   Vaping Use    Vaping status: Every Day   Substance Use Topics    Alcohol use: Never    Drug use: Never        Jade Wells,  [facility]  06/16/25 2042

## 2025-06-27 ENCOUNTER — HOSPITAL ENCOUNTER (OUTPATIENT)
Dept: RADIOLOGY | Facility: HOSPITAL | Age: 20
Discharge: HOME | End: 2025-06-27
Attending: STUDENT IN AN ORGANIZED HEALTH CARE EDUCATION/TRAINING PROGRAM
Payer: COMMERCIAL

## 2025-06-27 DIAGNOSIS — R10.11 RIGHT UPPER QUADRANT ABDOMINAL PAIN: ICD-10-CM

## 2025-06-27 PROCEDURE — 78227 HEPATOBIL SYST IMAGE W/DRUG: CPT | Mod: TC

## 2025-06-27 PROCEDURE — A9537 TC99M MEBROFENIN: HCPCS | Performed by: STUDENT IN AN ORGANIZED HEALTH CARE EDUCATION/TRAINING PROGRAM

## 2025-06-27 PROCEDURE — 78227 HEPATOBIL SYST IMAGE W/DRUG: CPT | Mod: 26,,, | Performed by: NUCLEAR MEDICINE

## 2025-06-27 RX ORDER — KIT FOR THE PREPARATION OF TECHNETIUM TC 99M MEBROFENIN 45 MG/10ML
4.7 INJECTION, POWDER, LYOPHILIZED, FOR SOLUTION INTRAVENOUS
Status: COMPLETED | OUTPATIENT
Start: 2025-06-27 | End: 2025-06-27

## 2025-06-27 RX ADMIN — MEBROFENIN 4.7 MILLICURIE: 45 INJECTION, POWDER, LYOPHILIZED, FOR SOLUTION INTRAVENOUS at 09:06

## 2025-06-30 DIAGNOSIS — R10.11 RIGHT UPPER QUADRANT ABDOMINAL PAIN: Primary | ICD-10-CM

## 2025-07-02 ENCOUNTER — RESULTS FOLLOW-UP (OUTPATIENT)
Dept: SURGERY | Facility: CLINIC | Age: 20
End: 2025-07-02

## 2025-07-02 ENCOUNTER — TELEPHONE (OUTPATIENT)
Dept: SURGERY | Facility: CLINIC | Age: 20
End: 2025-07-02
Payer: COMMERCIAL

## 2025-07-02 NOTE — TELEPHONE ENCOUNTER
----- Message from Marco Benitez DO sent at 6/28/2025  4:18 PM CDT -----  Notify patient HIDA scan came back negative.  Set up for GI referral for EGD  ----- Message -----  From: Interface, Rad Results In  Sent: 6/27/2025  11:51 AM CDT  To: Marco Benitez DO    Pt notified, scheduled to see GI on 7/9/25.

## 2025-07-09 ENCOUNTER — OFFICE VISIT (OUTPATIENT)
Dept: GASTROENTEROLOGY | Facility: CLINIC | Age: 20
End: 2025-07-09
Payer: COMMERCIAL

## 2025-07-09 VITALS
OXYGEN SATURATION: 99 % | BODY MASS INDEX: 24.28 KG/M2 | SYSTOLIC BLOOD PRESSURE: 113 MMHG | DIASTOLIC BLOOD PRESSURE: 73 MMHG | WEIGHT: 150.38 LBS | HEART RATE: 91 BPM

## 2025-07-09 DIAGNOSIS — R11.2 NAUSEA AND VOMITING, UNSPECIFIED VOMITING TYPE: ICD-10-CM

## 2025-07-09 DIAGNOSIS — R10.9 CHRONIC ABDOMINAL PAIN: ICD-10-CM

## 2025-07-09 DIAGNOSIS — R10.13 EPIGASTRIC PAIN: ICD-10-CM

## 2025-07-09 DIAGNOSIS — R63.4 WEIGHT LOSS: Primary | ICD-10-CM

## 2025-07-09 DIAGNOSIS — G89.29 CHRONIC ABDOMINAL PAIN: ICD-10-CM

## 2025-07-09 PROCEDURE — 99214 OFFICE O/P EST MOD 30 MIN: CPT | Mod: PBBFAC

## 2025-07-09 PROCEDURE — 99999 PR PBB SHADOW E&M-EST. PATIENT-LVL IV: CPT | Mod: PBBFAC,,,

## 2025-07-09 NOTE — PROGRESS NOTES
CC:  Epigastric pain, nausea vomiting, unintentional weight loss      HPI 20 y.o. white female presents today for complaint of epigastric pain with nausea vomiting and await loss of 20 lb in the last 3 months.  Patient states that she has been hurting in burning in her epigastric region with nausea and vomiting constant on and off for 3 months.  Patient has been seen by General surgery and HIDA scan and ultrasound performed with normal findings of gallbladder function.  It does take 800 mg of ibuprofen as needed for menorrhagia.  Patient has been taking Prilosec and Carafate but continues to have pain with some nausea.  Nausea has since subsided in the weight loss but patient is very tender upon palpation to her epigastric region.  Patient does admit to having some constipation but she has been using MiraLax in her bowel movements are much better.  Patient and mother state that patient recently had a seizure was having an EEG of recent in the machine broke during her exam and is having to reschedule.  Patient has a history of anxiety disorder and depression.  Patient denies any dysphagia, hematochezia or melena.  Hemoglobin 11.7 hematocrit 33.8 ALT 9 AST 15 alkaline phos 30    Medical records reviewed. Additional history supplemented by nursing.     Past Medical History:   Diagnosis Date    Anxiety disorder, unspecified     Depression          Past Surgical History:   Procedure Laterality Date    MYRINGOTOMY WITH INSERTION OF VENTILATION TUBE Bilateral 12/28/2021    Procedure: MYRINGOTOMY, WITH TYMPANOSTOMY TUBE INSERTION;  Surgeon: Ethan Willett MD;  Location: [facility] OR;  Service: ENT;  Laterality: Bilateral;    TONSILLECTOMY, ADENOIDECTOMY Bilateral 12/28/2021    Procedure: TONSILLECTOMY AND ADENOIDECTOMY;  Surgeon: Ethan Willett MD;  Location: [facility] OR;  Service: ENT;  Laterality: Bilateral;       Social History  Social History[1]      Family History   Problem Relation Name Age of Onset    Ovarian  cancer Maternal Grandmother      Cancer Maternal Grandmother          ovarian    Hypertension Father      Diabetes Father      Hypertension Mother      Diabetes Mother         Review of Systems   Gastrointestinal:  Positive for abdominal pain, constipation, heartburn, nausea and vomiting. Negative for blood in stool, diarrhea and melena.        Epigastric pain with tenderness   All other systems reviewed and are negative.       Physical Exam  Constitutional:       General: She is not in acute distress.     Appearance: Normal appearance. She is not ill-appearing.   Abdominal:      General: Bowel sounds are normal. There is no distension.      Palpations: Abdomen is soft.      Tenderness: There is abdominal tenderness in the epigastric area.       Skin:     General: Skin is warm and dry.      Coloration: Skin is not jaundiced.   Neurological:      Mental Status: She is alert and oriented to person, place, and time.   Psychiatric:         Mood and Affect: Mood normal.         Behavior: Behavior normal.          Labs:  Lab Results   Component Value Date    WBC 11.12 (H) 06/16/2025    HGB 11.7 (L) 06/16/2025    HCT 33.8 (L) 06/16/2025    MCV 87.6 06/16/2025     06/16/2025       CMP  Sodium   Date Value Ref Range Status   06/16/2025 137 136 - 145 mmol/L Final     Potassium   Date Value Ref Range Status   06/16/2025 4.2 3.5 - 5.1 mmol/L Final     Chloride   Date Value Ref Range Status   06/16/2025 107 98 - 107 mmol/L Final     CO2   Date Value Ref Range Status   06/16/2025 19 (L) 22 - 29 mmol/L Final     Glucose   Date Value Ref Range Status   06/16/2025 143 (H) 74 - 100 mg/dL Final     BUN   Date Value Ref Range Status   06/16/2025 5 (L) 7 - 19 mg/dL Final     Creatinine   Date Value Ref Range Status   06/16/2025 0.96 0.55 - 1.02 mg/dL Final     Calcium   Date Value Ref Range Status   06/16/2025 9.1 8.4 - 10.2 mg/dL Final     Total Protein   Date Value Ref Range Status   06/16/2025 7.3 6.4 - 8.3 g/dL Final      Albumin   Date Value Ref Range Status   06/16/2025 4.0 3.5 - 5.0 g/dL Final     Bilirubin, Total   Date Value Ref Range Status   06/16/2025 0.2 <=1.5 mg/dL Final     Alk Phos   Date Value Ref Range Status   06/16/2025 30 (L) 40 - 150 U/L Final     AST   Date Value Ref Range Status   06/16/2025 15 11 - 45 U/L Final     ALT   Date Value Ref Range Status   06/16/2025 9 <=55 U/L Final     Anion Gap   Date Value Ref Range Status   06/16/2025 15 7 - 16 mmol/L Final       Imaging:    Assessment: Reginald Castro 21 yo female here with:  Nausea vomiting  Epigastric pain  Unintentional weight loss 20 lb    Plan:   EGD scheduled 07/21/2025 for complaint of epigastric pain nausea vomiting and weight loss of 20 lb  Continue taking your Carafate as prescribed  Continue taking your Prilosec 20 mg as prescribed  Follow-up in 3 or 4 months    I spent a total of 45 minutes on the day of the visit.This includes face to face time and non-face to face time preparing to see the patient (eg, review of tests), obtaining and/or reviewing separately obtained history, documenting clinical information in the electronic or other health record, independently interpreting results and communicating results to the patient/family/caregiver, or care coordinator.   Carla Adams, FNP [facility]           [1]   Social History  Tobacco Use    Smoking status: Some Days     Types: Vaping with nicotine     Start date: 3/6/2024    Smokeless tobacco: Never   Vaping Use    Vaping status: Every Day   Substance Use Topics    Alcohol use: Never    Drug use: Never

## 2025-07-15 ENCOUNTER — PROCEDURE VISIT (OUTPATIENT)
Dept: NEUROLOGY | Facility: HOSPITAL | Age: 20
End: 2025-07-15
Attending: NURSE PRACTITIONER
Payer: COMMERCIAL

## 2025-07-15 DIAGNOSIS — R56.9 SEIZURE-LIKE ACTIVITY: Primary | ICD-10-CM

## 2025-07-15 PROCEDURE — 95813 EEG EXTND MNTR 61-119 MIN: CPT

## 2025-07-15 PROCEDURE — 95816 EEG AWAKE AND DROWSY: CPT

## 2025-07-15 NOTE — PROCEDURES
ELECTROENCEPHALOGRAM REPORT    DATE OF SERVICE: 07/15/2025  EEG NUMBER: [id number]  REQUESTED BY:   LOCATION OF SERVICE: [facility]    METHODOLOGY   Electroencephalographic (EEG) recording is with electrodes placed according to the International 10-20 placement system.  Thirty two (32) channels of digital signal (sampling rate of 512/sec) including T1 and T2 was simultaneously recorded from the scalp and may include  EKG, EMG, and/or eye monitors.  Recording band pass was 0.1 to 512 hz.  Digital video recording of the patient is simultaneously recorded with the EEG.  The patient is instructed report clinical symptoms which may occur during the recording session.  EEG and video recording is stored and archived in digital format. Activation procedures which include photic stimulation, hyperventilation and instructing patients to perform simple task are done in selected patients.    The EEG is displayed on a monitor screen and can be reviewed using different montages.  Computer assisted analysis is employed to detect spike and electrographic seizure activity.   The entire record is submitted for computer analysis.  The entire recording is visually reviewed and the times identified by computer analysis as being spikes or seizures are reviewed again.  Compresses spectral analysis (CSA) is also performed on the activity recorded from each individual channel.  This is displayed as a power display of frequencies from 0 to 30 Hz over time.   The CSA is reviewed looking for asymmetries in power between homologous areas of the scalp and then compared with the original EEG recording.     Axeda software was also utilized in the review of this study.  This software suite analyzes the EEG recording in multiple domains.  Coherence and rhythmicity is computed to identify EEG sections which may contain organized seizures.  Each channel undergoes analysis to detect presence of spike and sharp waves which have special  and morphological characteristic of epileptic activity.  The routine EEG recording is converted from spacial into frequency domain.  This is then displayed comparing homologous areas to identify areas of significant asymmetry.  Algorithm to identify non-cortically generated artifact is used to separate eye movement, EMG and other artifact from the EEG    EEG FINDINGS  During the maximally alert state, a 8-9 Hz posterior dominant rhythm was seen which was symmetrical, well-regulated and briskly attenuated to eye opening. In the more anterior head regions, symmetric frontocentral beta frequencies predominated. As drowsiness occurred, the posterior dominant rhythm attenuated, slow rolling eye movements appeared, and symmetrical vertex sharp transients were seen. Stage N2 sleep was reached and was characterized proc by high amplitude K-complexes and 12-15 Hz symmetrical and synchronous frontocentral sleep spindles.    No epileptiform findings were noted, no electrographic seizures were seen, and no clinical events were reported.      Activation Procedures   Hyperventilation - generalized 6-7 Hz sharply contoured slowing was noted during and after HV   Photic Stimulation     - occipital driving - now    - Pathological discharges produced - NO      IMPRESSION:  Normal awake and asleep    CLINICAL CORRELATION:  This is a normal EEG in awake and asleep states.  There were no epileptiform findings, electrographic seizures, or no clinical events recorded. An EEG without epileptiform findings does not exclude the possibility of epilepsy. If the clinical suspicion of epilepsy is high, a repeat EEG after sleep depreivation, following a seizure, or a prolonged EEG may increase the frequency of detecting epileptiform discharges.  Sharply contoured slowing with hyperventilation was noted and is considered to be nonspecific and of unclear clinical significance.      Sridevi Jordan MD  Neurology

## 2025-07-21 ENCOUNTER — ANESTHESIA (OUTPATIENT)
Dept: GASTROENTEROLOGY | Facility: HOSPITAL | Age: 20
End: 2025-07-21
Payer: COMMERCIAL

## 2025-07-21 ENCOUNTER — ANESTHESIA EVENT (OUTPATIENT)
Dept: GASTROENTEROLOGY | Facility: HOSPITAL | Age: 20
End: 2025-07-21
Payer: COMMERCIAL

## 2025-07-21 ENCOUNTER — HOSPITAL ENCOUNTER (OUTPATIENT)
Dept: GASTROENTEROLOGY | Facility: HOSPITAL | Age: 20
Discharge: HOME | End: 2025-07-21
Admitting: INTERNAL MEDICINE
Payer: COMMERCIAL

## 2025-07-21 VITALS
SYSTOLIC BLOOD PRESSURE: 115 MMHG | RESPIRATION RATE: 11 BRPM | WEIGHT: 150 LBS | BODY MASS INDEX: 24.11 KG/M2 | TEMPERATURE: 98 F | DIASTOLIC BLOOD PRESSURE: 70 MMHG | HEART RATE: 63 BPM | HEIGHT: 66 IN | OXYGEN SATURATION: 100 %

## 2025-07-21 DIAGNOSIS — R10.13 EPIGASTRIC PAIN: ICD-10-CM

## 2025-07-21 DIAGNOSIS — R63.4 WEIGHT LOSS: ICD-10-CM

## 2025-07-21 DIAGNOSIS — R11.2 NAUSEA AND VOMITING, UNSPECIFIED VOMITING TYPE: ICD-10-CM

## 2025-07-21 LAB — POC URINE HCG: NEGATIVE

## 2025-07-21 PROCEDURE — 88305 TISSUE EXAM BY PATHOLOGIST: CPT | Mod: 26,,, | Performed by: PATHOLOGY

## 2025-07-21 PROCEDURE — 63600175 PHARM REV CODE 636 W HCPCS: Performed by: NURSE ANESTHETIST, CERTIFIED REGISTERED

## 2025-07-21 PROCEDURE — 43239 EGD BIOPSY SINGLE/MULTIPLE: CPT | Performed by: INTERNAL MEDICINE

## 2025-07-21 PROCEDURE — 88342 IMHCHEM/IMCYTCHM 1ST ANTB: CPT | Mod: 26,,, | Performed by: PATHOLOGY

## 2025-07-21 PROCEDURE — 88305 TISSUE EXAM BY PATHOLOGIST: CPT | Mod: TC,SUR | Performed by: INTERNAL MEDICINE

## 2025-07-21 PROCEDURE — 27201423 OPTIME MED/SURG SUP & DEVICES STERILE SUPPLY

## 2025-07-21 PROCEDURE — 37000008 HC ANESTHESIA 1ST 15 MINUTES

## 2025-07-21 PROCEDURE — 43239 EGD BIOPSY SINGLE/MULTIPLE: CPT | Mod: ,,, | Performed by: INTERNAL MEDICINE

## 2025-07-21 RX ORDER — SODIUM CHLORIDE, SODIUM LACTATE, POTASSIUM CHLORIDE, CALCIUM CHLORIDE 600; 310; 30; 20 MG/100ML; MG/100ML; MG/100ML; MG/100ML
INJECTION, SOLUTION INTRAVENOUS CONTINUOUS
Status: DISCONTINUED | OUTPATIENT
Start: 2025-07-21 | End: 2025-07-22 | Stop reason: HOSPADM

## 2025-07-21 RX ORDER — SODIUM CHLORIDE 0.9 % (FLUSH) 0.9 %
10 SYRINGE (ML) INJECTION EVERY 6 HOURS PRN
Status: DISCONTINUED | OUTPATIENT
Start: 2025-07-21 | End: 2025-07-22 | Stop reason: HOSPADM

## 2025-07-21 RX ORDER — PROPOFOL 10 MG/ML
VIAL (ML) INTRAVENOUS
Status: DISCONTINUED | OUTPATIENT
Start: 2025-07-21 | End: 2025-07-21

## 2025-07-21 RX ORDER — LIDOCAINE HYDROCHLORIDE 20 MG/ML
INJECTION, SOLUTION EPIDURAL; INFILTRATION; INTRACAUDAL; PERINEURAL
Status: DISCONTINUED | OUTPATIENT
Start: 2025-07-21 | End: 2025-07-21

## 2025-07-21 RX ADMIN — PROPOFOL 50 MG: 10 INJECTION, EMULSION INTRAVENOUS at 12:07

## 2025-07-21 RX ADMIN — LIDOCAINE HYDROCHLORIDE 80 MG: 20 INJECTION, SOLUTION EPIDURAL; INFILTRATION; INTRACAUDAL; PERINEURAL at 12:07

## 2025-07-21 RX ADMIN — PROPOFOL 100 MG: 10 INJECTION, EMULSION INTRAVENOUS at 12:07

## 2025-07-21 NOTE — ANESTHESIA POSTPROCEDURE EVALUATION
Anesthesia Post Evaluation    Patient: Reginald Castro    Procedure(s) Performed: * No procedures listed *    Final Anesthesia Type: general (The pt was Not Arousable even with painful stimulation during procedure)      Patient location during evaluation: GI PACU ([facility])  Patient participation: Yes- Able to Participate  Level of consciousness: awake and alert  Post-procedure vital signs: reviewed and stable  Pain management: adequate  Airway patency: patent    PONV status at discharge: No PONV  Anesthetic complications: no      Cardiovascular status: blood pressure returned to baseline, hemodynamically stable and stable  Respiratory status: unassisted  Hydration status: euvolemic  Follow-up not needed.  Comments: Refer to nursing note for pain/noe score upon discharge from recovery.               Vitals Value Taken Time   /70 07/21/25 13:21   Temp 98 07/21/25 14:35   Pulse 70 07/21/25 13:26   Resp 15 07/21/25 13:26   SpO2 100 % 07/21/25 13:26   Vitals shown include unfiled device data.      Event Time   Out of Recovery 13:28:08         Pain/Noe Score: Noe Score: 10 (7/21/2025  1:07 PM)

## 2025-07-21 NOTE — ANESTHESIA PREPROCEDURE EVALUATION
07/21/2025  Reginald Castro is a 20 y.o., female.      Pre-op Assessment    I have reviewed the Patient Summary Reports.     I have reviewed the Nursing Notes. I have reviewed the NPO Status.   I have reviewed the Medications.     Review of Systems  Anesthesia Hx:  No problems with previous Anesthesia   History of prior surgery of interest to airway management or planning:          Denies Family Hx of Anesthesia complications.    Denies Personal Hx of Anesthesia complications.                    Psych:  Psychiatric History                Active Ambulatory Problems     Diagnosis Date Noted    Nausea and vomiting 08/07/2021    Dysmenorrhea in adolescent 10/09/2022    Menorrhagia with irregular cycle 10/09/2022    Anxiety disorder, unspecified     Depression     Weight loss 07/09/2025    Epigastric pain 07/09/2025     Resolved Ambulatory Problems     Diagnosis Date Noted    Exposure to SARS-associated coronavirus 08/07/2021    Chronic tonsillitis and adenoiditis     Tonsillitis and adenoiditis, chronic      No Additional Past Medical History   Review of patient's allergies indicates:  No Known Allergies  Medications Ordered Prior to Encounter[1]  Past Surgical History:   Procedure Laterality Date    MYRINGOTOMY WITH INSERTION OF VENTILATION TUBE Bilateral 12/28/2021    Procedure: MYRINGOTOMY, WITH TYMPANOSTOMY TUBE INSERTION;  Surgeon: Ethan Willett MD;  Location: [facility];  Service: ENT;  Laterality: Bilateral;    TONSILLECTOMY, ADENOIDECTOMY Bilateral 12/28/2021    Procedure: TONSILLECTOMY AND ADENOIDECTOMY;  Surgeon: Ethan Willett MD;  Location: [facility];  Service: ENT;  Laterality: Bilateral;         Physical Exam  General: Well nourished    Airway:  Mallampati: II   Mouth Opening: Normal  TM Distance: Normal, at least 6 cm  Tongue: Normal  Neck ROM: Normal ROM        Anesthesia Plan  Type of  Anesthesia, risks & benefits discussed:    Anesthesia Type: MAC  Intra-op Monitoring Plan: Standard ASA Monitors  Post Op Pain Control Plan: multimodal analgesia  Induction:  IV  Informed Consent: Informed consent signed with the Patient and all parties understand the risks and agree with anesthesia plan.  All questions answered. Patient consented to blood products? No  ASA Score: 3  Day of Surgery Review of History & Physical: H&P Update referred to the surgeon/provider.I have interviewed and examined the patient. I have reviewed the patient's H&P dated: There are no significant changes.     Ready For Surgery From Anesthesia Perspective.     .           [1]   Current Outpatient Medications on File Prior to Visit   Medication Sig Dispense Refill    docusate sodium (COLACE) 100 MG capsule Take 1 capsule (100 mg total) by mouth 2 (two) times daily as needed. 60 capsule 0    ibuprofen (ADVIL,MOTRIN) 800 MG tablet Take 1 tablet (800 mg total) by mouth every 8 (eight) hours as needed for Pain. 30 tablet 11    norgestimate-ethinyl estradioL (ORTHO TRI-CYCLEN LO) 0.18/0.215/0.25 mg-25 mcg tablet Take 1 tablet by mouth once daily. 28 tablet 11    omeprazole (PRILOSEC) 20 MG capsule Take 1 capsule (20 mg total) by mouth once daily. 90 capsule 3    sucralfate (CARAFATE) 1 gram tablet Take 1 tablet (1 g total) by mouth 4 (four) times daily. 120 tablet 0     No current facility-administered medications on file prior to visit.

## 2025-07-21 NOTE — DISCHARGE INSTRUCTIONS
Procedure Date  7/21/25     Impression  Overall Impression:   The esophagus appeared normal.  Mild erythematous mucosa in the antrum; performed cold forceps biopsy to rule out H. pylori  The duodenal bulb and 2nd part of the duodenum appeared normal.  Performed forceps biopsies in the duodenum to rule out celiac disease        Recommendation  - EGD without any significant findings except mild gastritis which is unlikely to be the cause of all symptoms  - Symptomatic management with PPI daily  - Anti-emetics as needed  - Discharge patient to home  - Advance diet as tolerated  - Continue present medications  - Await pathology results  - Patient has a contact number available for emergencies. The signs and symptoms of potential delayed complications were discussed with the patient. Return to normal activities tomorrow. Written discharge instructions were provided to the patient.  NO DRIVING, OPERATING EQUIPMENT, OR SIGNING LEGAL DOCUMENTS FOR 24 HOURS.

## 2025-07-21 NOTE — H&P
History & Physical - Short Stay  Gastroenterology      SUBJECTIVE:     Procedure: EGD    Chief Complaint/Indication for Procedure: Weight loss, nausea, abdominal pain    (Not in a hospital admission)      Review of patient's allergies indicates:  No Known Allergies     Past Medical History:   Diagnosis Date    Anxiety disorder, unspecified     Depression     Seizure      Past Surgical History:   Procedure Laterality Date    MYRINGOTOMY WITH INSERTION OF VENTILATION TUBE Bilateral 12/28/2021    Procedure: MYRINGOTOMY, WITH TYMPANOSTOMY TUBE INSERTION;  Surgeon: Ethan Willett MD;  Location: [facility] [place];  Service: ENT;  Laterality: Bilateral;    TONSILLECTOMY, ADENOIDECTOMY Bilateral 12/28/2021    Procedure: TONSILLECTOMY AND ADENOIDECTOMY;  Surgeon: Ethan Willett MD;  Location: [facility] [place];  Service: ENT;  Laterality: Bilateral;     Family History   Problem Relation Name Age of Onset    Ovarian cancer Maternal Grandmother      Cancer Maternal Grandmother          ovarian    Hypertension Father      Diabetes Father      Hypertension Mother      Diabetes Mother       Social History[1]      OBJECTIVE:     Vital Signs (Most Recent)  Temp: 98.3 °F (36.8 °C) (07/21/25 1143)  Pulse: 75 (07/21/25 1143)  Resp: (!) 21 (07/21/25 1143)  BP: 106/71 (07/21/25 1143)  SpO2: 99 % (07/21/25 1143)    Physical Exam:                                                       General appearance: alert, cooperative, no distress, comfortable appearing  HENT: Normocephalic, atraumatic, neck symmetrical  Eyes: conjunctivae clear  Lungs: No labored breathing  Abd: Soft, non-tender    ASSESSMENT/PLAN:     Assessment: Weight loss, nausea, abdominal pain    Plan: EGD         [1]   Social History  Tobacco Use    Smoking status: Some Days     Types: Vaping with nicotine     Start date: 3/6/2024    Smokeless tobacco: Never   Vaping Use    Vaping status: Every Day   Substance Use Topics    Alcohol use: Never    Drug use: Never

## 2025-07-22 LAB
ESTROGEN SERPL-MCNC: NORMAL PG/ML
INSULIN SERPL-ACNC: NORMAL U[IU]/ML
LAB AP GROSS DESCRIPTION: NORMAL
LAB AP LABORATORY NOTES: NORMAL
T3RU NFR SERPL: NORMAL %

## 2025-07-25 RX ORDER — METRONIDAZOLE 500 MG/1
500 TABLET ORAL 3 TIMES DAILY
Qty: 42 TABLET | Refills: 0 | Status: SHIPPED | OUTPATIENT
Start: 2025-07-25 | End: 2025-08-08

## 2025-07-25 RX ORDER — PANTOPRAZOLE SODIUM 20 MG/1
20 TABLET, DELAYED RELEASE ORAL
Qty: 28 TABLET | Refills: 0 | Status: SHIPPED | OUTPATIENT
Start: 2025-07-25 | End: 2025-08-08

## 2025-07-25 RX ORDER — TETRACYCLINE HYDROCHLORIDE 500 MG/1
500 CAPSULE ORAL 4 TIMES DAILY
Qty: 56 CAPSULE | Refills: 0 | Status: SHIPPED | OUTPATIENT
Start: 2025-07-25 | End: 2025-08-08

## 2025-07-25 RX ORDER — BISMUTH SUBSALICYLATE 262 MG/1
1 TABLET ORAL 4 TIMES DAILY
COMMUNITY
Start: 2025-07-25 | End: 2025-08-08

## 2025-07-31 ENCOUNTER — TELEPHONE (OUTPATIENT)
Dept: GASTROENTEROLOGY | Facility: CLINIC | Age: 20
End: 2025-07-31
Payer: COMMERCIAL

## 2025-07-31 NOTE — TELEPHONE ENCOUNTER
Call returned - spoke w/ pt whom states that the medication she was prescribed is causing her severe h/a that worsens after each dose - advised to medication and that I will f/u w/ provider regarding - good vu noted

## 2025-07-31 NOTE — TELEPHONE ENCOUNTER
----- Message from Leonid Rae MD sent at 7/25/2025  1:53 PM CDT -----  The test results show that you have Helicobacter pylori gastritis. This is a bacteria that may predispose to peptic ulcer disease and can be treated with medications. We will start quadruple therapy   with the following medications:     Protonix 20 mg oral twice daily, Tetracycline 500 mg oral four times daily, Flagyl 500 mg oral three times daily, and Bismuth Subsalicylate 262 mg four times daily; you will take these 4 medications   as prescribed for 2 weeks. You can purchase the bismuth over the counter (eg Pepto-bismol). Avoid alcohol while taking these medications as it can react with flagyl. Drink plenty of water to make   sure all pills clear the esophagus.     Protonix is in the same drug class as Prilosec, so you SHOULD NOT TAKE PRILOSEC DURING THE 2 WEEKS THAT YOU ARE TAKING PROTONIX; YOU CAN RESUME YOUR PRILOSEC AFTER COMPLETION OF THIS THERAPY    You will need an H pylori Stool Antigen test or Urea breath test 4-6 weeks AFTER you complete quadruple therapy. This can be performed at our clinic or with your primary care physician. The test   needs to be performed AFTER you have been completely OFF PPI (eg protonix/prilosec) for 1-2 weeks.     ----- Message -----  From: Interface, Lab In [facility]  Sent: 7/21/2025  12:08 PM CDT  To: Leonid Rae MD

## 2025-07-31 NOTE — TELEPHONE ENCOUNTER
Pt aware of results and med therapy and f/u - request for different med therapy noted - pt advised I would f/u w/ provider regarding

## 2025-08-01 RX ORDER — BISMUTH SUBCITRATE POTASSIUM, METRONIDAZOLE AND TETRACYCLINE HYDROCHLORIDE 140; 125; 125 MG/1; MG/1; MG/1
3 CAPSULE ORAL
Qty: 168 CAPSULE | Refills: 0 | Status: SHIPPED | OUTPATIENT
Start: 2025-08-01 | End: 2025-08-15

## 2025-08-07 ENCOUNTER — TELEPHONE (OUTPATIENT)
Dept: GASTROENTEROLOGY | Facility: CLINIC | Age: 20
End: 2025-08-07
Payer: COMMERCIAL

## 2025-08-07 RX ORDER — AMOXICILLIN 500 MG/1
1000 CAPSULE ORAL EVERY 12 HOURS
Qty: 56 CAPSULE | Refills: 0 | Status: SHIPPED | OUTPATIENT
Start: 2025-08-07 | End: 2025-08-21

## 2025-08-07 RX ORDER — CLARITHROMYCIN 500 MG/1
500 TABLET, FILM COATED ORAL EVERY 12 HOURS
Qty: 28 TABLET | Refills: 0 | Status: SHIPPED | OUTPATIENT
Start: 2025-08-07 | End: 2025-08-21

## 2025-08-12 ENCOUNTER — HOSPITAL ENCOUNTER (EMERGENCY)
Facility: HOSPITAL | Age: 20
Discharge: HOME | End: 2025-08-12
Payer: COMMERCIAL

## 2025-08-12 VITALS
HEIGHT: 66 IN | OXYGEN SATURATION: 97 % | RESPIRATION RATE: 18 BRPM | TEMPERATURE: 98 F | WEIGHT: 156 LBS | DIASTOLIC BLOOD PRESSURE: 66 MMHG | SYSTOLIC BLOOD PRESSURE: 113 MMHG | BODY MASS INDEX: 25.07 KG/M2 | HEART RATE: 84 BPM

## 2025-08-12 DIAGNOSIS — J02.0 PHARYNGITIS DUE TO STREPTOCOCCUS SPECIES: Primary | ICD-10-CM

## 2025-08-12 PROCEDURE — 99283 EMERGENCY DEPT VISIT LOW MDM: CPT

## 2025-08-12 PROCEDURE — 99284 EMERGENCY DEPT VISIT MOD MDM: CPT | Mod: GF,,, | Performed by: NURSE PRACTITIONER

## 2025-08-12 RX ORDER — AZITHROMYCIN 250 MG/1
250 TABLET, FILM COATED ORAL DAILY
Qty: 6 TABLET | Refills: 0 | Status: SHIPPED | OUTPATIENT
Start: 2025-08-12

## 2025-08-13 ENCOUNTER — TELEPHONE (OUTPATIENT)
Dept: GASTROENTEROLOGY | Facility: CLINIC | Age: 20
End: 2025-08-13
Payer: COMMERCIAL

## 2025-08-13 DIAGNOSIS — A04.8 H. PYLORI INFECTION: Primary | ICD-10-CM

## 2025-08-25 PROBLEM — R11.2 NAUSEA AND VOMITING: Status: RESOLVED | Noted: 2021-08-07 | Resolved: 2025-08-25

## (undated) DEVICE — PACK TIBURON BASIC

## (undated) DEVICE — NEEDLE INSULATED COATED EXTENDED

## (undated) DEVICE — SOL IRRIGATION SALINE 0.9% 1000ML BOTTLE

## (undated) DEVICE — BLADE MYRINGOTOMY 7120

## (undated) DEVICE — GLOVE SURGICAL PROTEXIS PI SIZE 6

## (undated) DEVICE — PENCIL HANDSWITCHING ROCKER SW

## (undated) DEVICE — CLEANER TIP BOVIE ELECTROSURG

## (undated) DEVICE — TUBING SUCTION 5MM STERILE 3/16IN X 12FT

## (undated) DEVICE — TUBE VENT

## (undated) DEVICE — BALL COTTON STERILE ENT

## (undated) DEVICE — SYRINGE ASEPTO IRRIGATION BULB 60CC LF DISP

## (undated) DEVICE — GLOVE SURGICAL PROTEXIS PI SIZE 7.5

## (undated) DEVICE — GOWN SURGICAL SMARTGOWN LEVEL 4 / EXTRA LARGE STERILE